# Patient Record
Sex: FEMALE | Race: WHITE | HISPANIC OR LATINO | Employment: FULL TIME | URBAN - METROPOLITAN AREA
[De-identification: names, ages, dates, MRNs, and addresses within clinical notes are randomized per-mention and may not be internally consistent; named-entity substitution may affect disease eponyms.]

---

## 2021-09-20 ENCOUNTER — PRE-ADMISSION TESTING (OUTPATIENT)
Dept: ADMISSIONS | Facility: MEDICAL CENTER | Age: 26
End: 2021-09-20
Attending: OBSTETRICS & GYNECOLOGY
Payer: COMMERCIAL

## 2021-09-21 NOTE — OR NURSING
Notified Dwaine, Pharmacist in [facility], of patients history and possible need for medications for Von Willebrand.

## 2021-09-21 NOTE — OR NURSING
Patient telephone pre admission appointment completed 09-. Patient has Von Willebrand's and is followed by [facility] in [place]. Spoke with Dr. Sunni Ríos's surgery scheduler, and she confirmed that Dr. Moeller is contacting a hematologist.

## 2021-09-23 ENCOUNTER — ANESTHESIA EVENT (OUTPATIENT)
Dept: SURGERY | Facility: MEDICAL CENTER | Age: 26
End: 2021-09-23
Payer: COMMERCIAL

## 2021-09-23 NOTE — NON-PROVIDER
COVID-19 Pre-surgery screening:  ?  1. Do you have an undiagnosed respiratory illness or symptoms such as coughing or sneezing, SOB, loss of taste or smell? (No)      2. Do you have an unexplained fever greater than 100.4 degrees Fahrenheit or 38 degrees Celsius? (No)     3. Have you had direct exposure to a patient who tested positive for Covid-19? (No)  ?  4. Have you traveled within the last 14 days? (Yes-[place])  ?  Informed of visitor policy and mask use requirement  Yes

## 2021-09-24 ENCOUNTER — HOSPITAL ENCOUNTER (OUTPATIENT)
Facility: MEDICAL CENTER | Age: 26
End: 2021-09-25
Attending: OBSTETRICS & GYNECOLOGY | Admitting: OBSTETRICS & GYNECOLOGY
Payer: COMMERCIAL

## 2021-09-24 ENCOUNTER — ANESTHESIA (OUTPATIENT)
Dept: SURGERY | Facility: MEDICAL CENTER | Age: 26
End: 2021-09-24
Payer: COMMERCIAL

## 2021-09-24 PROBLEM — D68.00 VON WILLEBRAND DISEASE (HCC): Status: ACTIVE | Noted: 2021-09-24

## 2021-09-24 LAB
ABO + RH BLD: NORMAL
ABO GROUP BLD: NORMAL
BLD GP AB SCN SERPL QL: NORMAL
ERYTHROCYTE [DISTWIDTH] IN BLOOD BY AUTOMATED COUNT: 41.5 FL (ref 35.9–50)
EXTERNAL QUALITY CONTROL: NORMAL
HCG UR QL: NEGATIVE
HCT VFR BLD AUTO: 41.5 % (ref 37–47)
HGB BLD-MCNC: 13.5 G/DL (ref 12–16)
MCH RBC QN AUTO: 29.4 PG (ref 27–33)
MCHC RBC AUTO-ENTMCNC: 32.5 G/DL (ref 33.6–35)
MCV RBC AUTO: 90.4 FL (ref 81.4–97.8)
PLATELET # BLD AUTO: 224 K/UL (ref 164–446)
PMV BLD AUTO: 11.7 FL (ref 9–12.9)
RBC # BLD AUTO: 4.59 M/UL (ref 4.2–5.4)
RH BLD: NORMAL
SARS-COV+SARS-COV-2 AG RESP QL IA.RAPID: NEGATIVE
WBC # BLD AUTO: 9.4 K/UL (ref 4.8–10.8)

## 2021-09-24 PROCEDURE — A9270 NON-COVERED ITEM OR SERVICE: HCPCS | Performed by: ANESTHESIOLOGY

## 2021-09-24 PROCEDURE — 160041 HCHG SURGERY MINUTES - EA ADDL 1 MIN LEVEL 4: Performed by: OBSTETRICS & GYNECOLOGY

## 2021-09-24 PROCEDURE — 87426 SARSCOV CORONAVIRUS AG IA: CPT | Performed by: OBSTETRICS & GYNECOLOGY

## 2021-09-24 PROCEDURE — 86900 BLOOD TYPING SEROLOGIC ABO: CPT

## 2021-09-24 PROCEDURE — 700111 HCHG RX REV CODE 636 W/ 250 OVERRIDE (IP): Performed by: OBSTETRICS & GYNECOLOGY

## 2021-09-24 PROCEDURE — 86850 RBC ANTIBODY SCREEN: CPT

## 2021-09-24 PROCEDURE — 700101 HCHG RX REV CODE 250: Performed by: OBSTETRICS & GYNECOLOGY

## 2021-09-24 PROCEDURE — 700111 HCHG RX REV CODE 636 W/ 250 OVERRIDE (IP): Performed by: ANESTHESIOLOGY

## 2021-09-24 PROCEDURE — 36415 COLL VENOUS BLD VENIPUNCTURE: CPT

## 2021-09-24 PROCEDURE — 700105 HCHG RX REV CODE 258: Performed by: OBSTETRICS & GYNECOLOGY

## 2021-09-24 PROCEDURE — 160009 HCHG ANES TIME/MIN: Performed by: OBSTETRICS & GYNECOLOGY

## 2021-09-24 PROCEDURE — 502587 HCHG PACK, D&C: Performed by: OBSTETRICS & GYNECOLOGY

## 2021-09-24 PROCEDURE — G0378 HOSPITAL OBSERVATION PER HR: HCPCS

## 2021-09-24 PROCEDURE — 160002 HCHG RECOVERY MINUTES (STAT): Performed by: OBSTETRICS & GYNECOLOGY

## 2021-09-24 PROCEDURE — 700102 HCHG RX REV CODE 250 W/ 637 OVERRIDE(OP): Performed by: OBSTETRICS & GYNECOLOGY

## 2021-09-24 PROCEDURE — 88305 TISSUE EXAM BY PATHOLOGIST: CPT

## 2021-09-24 PROCEDURE — 85027 COMPLETE CBC AUTOMATED: CPT

## 2021-09-24 PROCEDURE — 160029 HCHG SURGERY MINUTES - 1ST 30 MINS LEVEL 4: Performed by: OBSTETRICS & GYNECOLOGY

## 2021-09-24 PROCEDURE — 81025 URINE PREGNANCY TEST: CPT

## 2021-09-24 PROCEDURE — 96374 THER/PROPH/DIAG INJ IV PUSH: CPT

## 2021-09-24 PROCEDURE — 160048 HCHG OR STATISTICAL LEVEL 1-5: Performed by: OBSTETRICS & GYNECOLOGY

## 2021-09-24 PROCEDURE — A9270 NON-COVERED ITEM OR SERVICE: HCPCS | Performed by: OBSTETRICS & GYNECOLOGY

## 2021-09-24 PROCEDURE — 160036 HCHG PACU - EA ADDL 30 MINS PHASE I: Performed by: OBSTETRICS & GYNECOLOGY

## 2021-09-24 PROCEDURE — 86901 BLOOD TYPING SEROLOGIC RH(D): CPT

## 2021-09-24 PROCEDURE — 700101 HCHG RX REV CODE 250: Performed by: ANESTHESIOLOGY

## 2021-09-24 PROCEDURE — 160035 HCHG PACU - 1ST 60 MINS PHASE I: Performed by: OBSTETRICS & GYNECOLOGY

## 2021-09-24 PROCEDURE — 700102 HCHG RX REV CODE 250 W/ 637 OVERRIDE(OP): Performed by: ANESTHESIOLOGY

## 2021-09-24 RX ORDER — DOCUSATE SODIUM 100 MG/1
100 CAPSULE, LIQUID FILLED ORAL 2 TIMES DAILY
Status: DISCONTINUED | OUTPATIENT
Start: 2021-09-24 | End: 2021-09-25 | Stop reason: HOSPADM

## 2021-09-24 RX ORDER — TRANEXAMIC ACID 650 MG/1
1300 TABLET ORAL EVERY 8 HOURS
Status: DISCONTINUED | OUTPATIENT
Start: 2021-09-24 | End: 2021-09-24

## 2021-09-24 RX ORDER — TRANEXAMIC ACID 650 MG/1
TABLET ORAL
Status: ON HOLD | COMMUNITY
End: 2022-11-24

## 2021-09-24 RX ORDER — DEXAMETHASONE SODIUM PHOSPHATE 4 MG/ML
4 INJECTION, SOLUTION INTRA-ARTICULAR; INTRALESIONAL; INTRAMUSCULAR; INTRAVENOUS; SOFT TISSUE
Status: DISCONTINUED | OUTPATIENT
Start: 2021-09-24 | End: 2021-09-25 | Stop reason: HOSPADM

## 2021-09-24 RX ORDER — HYDROMORPHONE HYDROCHLORIDE 1 MG/ML
0.5 INJECTION, SOLUTION INTRAMUSCULAR; INTRAVENOUS; SUBCUTANEOUS
Status: DISCONTINUED | OUTPATIENT
Start: 2021-09-24 | End: 2021-09-25 | Stop reason: HOSPADM

## 2021-09-24 RX ORDER — BISACODYL 10 MG
10 SUPPOSITORY, RECTAL RECTAL
Status: DISCONTINUED | OUTPATIENT
Start: 2021-09-24 | End: 2021-09-25 | Stop reason: HOSPADM

## 2021-09-24 RX ORDER — HYDROMORPHONE HYDROCHLORIDE 1 MG/ML
0.2 INJECTION, SOLUTION INTRAMUSCULAR; INTRAVENOUS; SUBCUTANEOUS
Status: DISCONTINUED | OUTPATIENT
Start: 2021-09-24 | End: 2021-09-24 | Stop reason: HOSPADM

## 2021-09-24 RX ORDER — OXYCODONE HCL 5 MG/5 ML
10 SOLUTION, ORAL ORAL
Status: DISCONTINUED | OUTPATIENT
Start: 2021-09-24 | End: 2021-09-24 | Stop reason: HOSPADM

## 2021-09-24 RX ORDER — CEFAZOLIN SODIUM 1 G/3ML
INJECTION, POWDER, FOR SOLUTION INTRAMUSCULAR; INTRAVENOUS PRN
Status: DISCONTINUED | OUTPATIENT
Start: 2021-09-24 | End: 2021-09-24 | Stop reason: SURG

## 2021-09-24 RX ORDER — ACETAMINOPHEN 500 MG
1000 TABLET ORAL ONCE
Status: COMPLETED | OUTPATIENT
Start: 2021-09-24 | End: 2021-09-24

## 2021-09-24 RX ORDER — SODIUM CHLORIDE, SODIUM LACTATE, POTASSIUM CHLORIDE, CALCIUM CHLORIDE 600; 310; 30; 20 MG/100ML; MG/100ML; MG/100ML; MG/100ML
INJECTION, SOLUTION INTRAVENOUS CONTINUOUS
Status: ACTIVE | OUTPATIENT
Start: 2021-09-24 | End: 2021-09-24

## 2021-09-24 RX ORDER — SCOLOPAMINE TRANSDERMAL SYSTEM 1 MG/1
1 PATCH, EXTENDED RELEASE TRANSDERMAL
Status: DISCONTINUED | OUTPATIENT
Start: 2021-09-24 | End: 2021-09-25 | Stop reason: HOSPADM

## 2021-09-24 RX ORDER — ENEMA 19; 7 G/133ML; G/133ML
1 ENEMA RECTAL
Status: DISCONTINUED | OUTPATIENT
Start: 2021-09-24 | End: 2021-09-25 | Stop reason: HOSPADM

## 2021-09-24 RX ORDER — OXYCODONE HCL 5 MG/5 ML
5 SOLUTION, ORAL ORAL
Status: DISCONTINUED | OUTPATIENT
Start: 2021-09-24 | End: 2021-09-24 | Stop reason: HOSPADM

## 2021-09-24 RX ORDER — HALOPERIDOL 5 MG/ML
1 INJECTION INTRAMUSCULAR
Status: DISCONTINUED | OUTPATIENT
Start: 2021-09-24 | End: 2021-09-24 | Stop reason: HOSPADM

## 2021-09-24 RX ORDER — AMOXICILLIN 250 MG
1 CAPSULE ORAL
Status: DISCONTINUED | OUTPATIENT
Start: 2021-09-24 | End: 2021-09-25 | Stop reason: HOSPADM

## 2021-09-24 RX ORDER — DIPHENHYDRAMINE HYDROCHLORIDE 50 MG/ML
25 INJECTION INTRAMUSCULAR; INTRAVENOUS EVERY 6 HOURS PRN
Status: DISCONTINUED | OUTPATIENT
Start: 2021-09-24 | End: 2021-09-25 | Stop reason: HOSPADM

## 2021-09-24 RX ORDER — MIDAZOLAM HYDROCHLORIDE 1 MG/ML
INJECTION INTRAMUSCULAR; INTRAVENOUS PRN
Status: DISCONTINUED | OUTPATIENT
Start: 2021-09-24 | End: 2021-09-24 | Stop reason: SURG

## 2021-09-24 RX ORDER — ONDANSETRON 2 MG/ML
4 INJECTION INTRAMUSCULAR; INTRAVENOUS
Status: DISCONTINUED | OUTPATIENT
Start: 2021-09-24 | End: 2021-09-24 | Stop reason: HOSPADM

## 2021-09-24 RX ORDER — OXYCODONE HYDROCHLORIDE 10 MG/1
10 TABLET ORAL
Status: DISCONTINUED | OUTPATIENT
Start: 2021-09-24 | End: 2021-09-25 | Stop reason: HOSPADM

## 2021-09-24 RX ORDER — OXYCODONE HYDROCHLORIDE 5 MG/1
5 TABLET ORAL
Status: DISCONTINUED | OUTPATIENT
Start: 2021-09-24 | End: 2021-09-25 | Stop reason: HOSPADM

## 2021-09-24 RX ORDER — ONDANSETRON 2 MG/ML
4 INJECTION INTRAMUSCULAR; INTRAVENOUS EVERY 4 HOURS PRN
Status: DISCONTINUED | OUTPATIENT
Start: 2021-09-24 | End: 2021-09-25 | Stop reason: HOSPADM

## 2021-09-24 RX ORDER — SIMETHICONE 80 MG
80 TABLET,CHEWABLE ORAL EVERY 8 HOURS PRN
Status: DISCONTINUED | OUTPATIENT
Start: 2021-09-24 | End: 2021-09-25 | Stop reason: HOSPADM

## 2021-09-24 RX ORDER — ACETAMINOPHEN 500 MG
1000 TABLET ORAL EVERY 6 HOURS
Status: DISCONTINUED | OUTPATIENT
Start: 2021-09-24 | End: 2021-09-25 | Stop reason: HOSPADM

## 2021-09-24 RX ORDER — POLYETHYLENE GLYCOL 3350 17 G/17G
1 POWDER, FOR SOLUTION ORAL 2 TIMES DAILY PRN
Status: DISCONTINUED | OUTPATIENT
Start: 2021-09-24 | End: 2021-09-25 | Stop reason: HOSPADM

## 2021-09-24 RX ORDER — LIDOCAINE HYDROCHLORIDE 20 MG/ML
INJECTION, SOLUTION EPIDURAL; INFILTRATION; INTRACAUDAL; PERINEURAL PRN
Status: DISCONTINUED | OUTPATIENT
Start: 2021-09-24 | End: 2021-09-24 | Stop reason: SURG

## 2021-09-24 RX ORDER — HALOPERIDOL 5 MG/ML
1 INJECTION INTRAMUSCULAR EVERY 6 HOURS PRN
Status: DISCONTINUED | OUTPATIENT
Start: 2021-09-24 | End: 2021-09-25 | Stop reason: HOSPADM

## 2021-09-24 RX ORDER — MEPERIDINE HYDROCHLORIDE 25 MG/ML
6.25 INJECTION INTRAMUSCULAR; INTRAVENOUS; SUBCUTANEOUS
Status: DISCONTINUED | OUTPATIENT
Start: 2021-09-24 | End: 2021-09-24 | Stop reason: HOSPADM

## 2021-09-24 RX ORDER — SUMATRIPTAN 25 MG/1
TABLET, FILM COATED ORAL
Status: ON HOLD | COMMUNITY
End: 2022-11-24

## 2021-09-24 RX ORDER — ONDANSETRON 2 MG/ML
INJECTION INTRAMUSCULAR; INTRAVENOUS PRN
Status: DISCONTINUED | OUTPATIENT
Start: 2021-09-24 | End: 2021-09-24 | Stop reason: SURG

## 2021-09-24 RX ORDER — HYDROMORPHONE HYDROCHLORIDE 1 MG/ML
0.4 INJECTION, SOLUTION INTRAMUSCULAR; INTRAVENOUS; SUBCUTANEOUS
Status: DISCONTINUED | OUTPATIENT
Start: 2021-09-24 | End: 2021-09-24 | Stop reason: HOSPADM

## 2021-09-24 RX ORDER — DIPHENHYDRAMINE HYDROCHLORIDE 50 MG/ML
12.5 INJECTION INTRAMUSCULAR; INTRAVENOUS
Status: DISCONTINUED | OUTPATIENT
Start: 2021-09-24 | End: 2021-09-24 | Stop reason: HOSPADM

## 2021-09-24 RX ORDER — KETOROLAC TROMETHAMINE 30 MG/ML
30 INJECTION, SOLUTION INTRAMUSCULAR; INTRAVENOUS EVERY 6 HOURS
Status: DISCONTINUED | OUTPATIENT
Start: 2021-09-24 | End: 2021-09-25 | Stop reason: HOSPADM

## 2021-09-24 RX ORDER — SODIUM CHLORIDE, SODIUM LACTATE, POTASSIUM CHLORIDE, CALCIUM CHLORIDE 600; 310; 30; 20 MG/100ML; MG/100ML; MG/100ML; MG/100ML
INJECTION, SOLUTION INTRAVENOUS CONTINUOUS
Status: DISCONTINUED | OUTPATIENT
Start: 2021-09-24 | End: 2021-09-24 | Stop reason: HOSPADM

## 2021-09-24 RX ORDER — DEXAMETHASONE SODIUM PHOSPHATE 4 MG/ML
INJECTION, SOLUTION INTRA-ARTICULAR; INTRALESIONAL; INTRAMUSCULAR; INTRAVENOUS; SOFT TISSUE PRN
Status: DISCONTINUED | OUTPATIENT
Start: 2021-09-24 | End: 2021-09-24 | Stop reason: SURG

## 2021-09-24 RX ORDER — ACETAMINOPHEN 500 MG
1000 TABLET ORAL EVERY 6 HOURS PRN
Status: DISCONTINUED | OUTPATIENT
Start: 2021-09-29 | End: 2021-09-25 | Stop reason: HOSPADM

## 2021-09-24 RX ORDER — HYDROMORPHONE HYDROCHLORIDE 1 MG/ML
0.1 INJECTION, SOLUTION INTRAMUSCULAR; INTRAVENOUS; SUBCUTANEOUS
Status: DISCONTINUED | OUTPATIENT
Start: 2021-09-24 | End: 2021-09-24 | Stop reason: HOSPADM

## 2021-09-24 RX ORDER — SCOLOPAMINE TRANSDERMAL SYSTEM 1 MG/1
PATCH, EXTENDED RELEASE TRANSDERMAL
Status: COMPLETED
Start: 2021-09-24 | End: 2021-09-24

## 2021-09-24 RX ORDER — IBUPROFEN 800 MG/1
800 TABLET ORAL 3 TIMES DAILY PRN
Status: DISCONTINUED | OUTPATIENT
Start: 2021-09-27 | End: 2021-09-25 | Stop reason: HOSPADM

## 2021-09-24 RX ORDER — AMOXICILLIN 250 MG
1 CAPSULE ORAL NIGHTLY
Status: DISCONTINUED | OUTPATIENT
Start: 2021-09-24 | End: 2021-09-25 | Stop reason: HOSPADM

## 2021-09-24 RX ADMIN — SODIUM CHLORIDE, POTASSIUM CHLORIDE, SODIUM LACTATE AND CALCIUM CHLORIDE: 600; 310; 30; 20 INJECTION, SOLUTION INTRAVENOUS at 12:19

## 2021-09-24 RX ADMIN — KETOROLAC TROMETHAMINE 30 MG: 30 INJECTION, SOLUTION INTRAMUSCULAR at 18:04

## 2021-09-24 RX ADMIN — FENTANYL CITRATE 25 MCG: 50 INJECTION, SOLUTION INTRAMUSCULAR; INTRAVENOUS at 15:40

## 2021-09-24 RX ADMIN — PROPOFOL 25 MG: 10 INJECTION, EMULSION INTRAVENOUS at 15:21

## 2021-09-24 RX ADMIN — CEFAZOLIN 2 G: 330 INJECTION, POWDER, FOR SOLUTION INTRAMUSCULAR; INTRAVENOUS at 15:32

## 2021-09-24 RX ADMIN — MIDAZOLAM HYDROCHLORIDE 2 MG: 1 INJECTION, SOLUTION INTRAMUSCULAR; INTRAVENOUS at 15:11

## 2021-09-24 RX ADMIN — LIDOCAINE HYDROCHLORIDE 50 MG: 20 INJECTION, SOLUTION EPIDURAL; INFILTRATION; INTRACAUDAL at 15:15

## 2021-09-24 RX ADMIN — FENTANYL CITRATE 25 MCG: 50 INJECTION, SOLUTION INTRAMUSCULAR; INTRAVENOUS at 15:37

## 2021-09-24 RX ADMIN — ACETAMINOPHEN 1000 MG: 500 TABLET ORAL at 12:19

## 2021-09-24 RX ADMIN — TRANEXAMIC ACID 1000 MG: 100 INJECTION, SOLUTION INTRAVENOUS at 20:45

## 2021-09-24 RX ADMIN — VON WILLEBRAND FACTOR/COAGULATION FACTOR VIII COMPLEX (HUMAN) 3405 INT'L UNITS: 100; 100 POWDER, FOR SOLUTION INTRAVENOUS at 12:18

## 2021-09-24 RX ADMIN — PROPOFOL 150 MG: 10 INJECTION, EMULSION INTRAVENOUS at 15:15

## 2021-09-24 RX ADMIN — PROPOFOL 25 MG: 10 INJECTION, EMULSION INTRAVENOUS at 15:42

## 2021-09-24 RX ADMIN — DEXAMETHASONE SODIUM PHOSPHATE 4 MG: 4 INJECTION, SOLUTION INTRA-ARTICULAR; INTRALESIONAL; INTRAMUSCULAR; INTRAVENOUS; SOFT TISSUE at 15:32

## 2021-09-24 RX ADMIN — ONDANSETRON 4 MG: 2 INJECTION INTRAMUSCULAR; INTRAVENOUS at 15:32

## 2021-09-24 RX ADMIN — ACETAMINOPHEN 1000 MG: 500 TABLET ORAL at 18:04

## 2021-09-24 ASSESSMENT — COGNITIVE AND FUNCTIONAL STATUS - GENERAL
SUGGESTED CMS G CODE MODIFIER MOBILITY: CH
SUGGESTED CMS G CODE MODIFIER DAILY ACTIVITY: CH
MOBILITY SCORE: 24
DAILY ACTIVITIY SCORE: 24

## 2021-09-24 ASSESSMENT — PAIN DESCRIPTION - PAIN TYPE
TYPE: SURGICAL PAIN

## 2021-09-24 ASSESSMENT — PATIENT HEALTH QUESTIONNAIRE - PHQ9
1. LITTLE INTEREST OR PLEASURE IN DOING THINGS: NOT AT ALL
2. FEELING DOWN, DEPRESSED, IRRITABLE, OR HOPELESS: NOT AT ALL
SUM OF ALL RESPONSES TO PHQ9 QUESTIONS 1 AND 2: 0

## 2021-09-24 ASSESSMENT — LIFESTYLE VARIABLES
AVERAGE NUMBER OF DAYS PER WEEK YOU HAVE A DRINK CONTAINING ALCOHOL: 1
TOTAL SCORE: 0
DOES PATIENT WANT TO STOP DRINKING: NO
EVER FELT BAD OR GUILTY ABOUT YOUR DRINKING: NO
HOW MANY TIMES IN THE PAST YEAR HAVE YOU HAD 5 OR MORE DRINKS IN A DAY: 0
HAVE PEOPLE ANNOYED YOU BY CRITICIZING YOUR DRINKING: NO
HAVE YOU EVER FELT YOU SHOULD CUT DOWN ON YOUR DRINKING: NO
ALCOHOL_USE: YES
TOTAL SCORE: 0
ON A TYPICAL DAY WHEN YOU DRINK ALCOHOL HOW MANY DRINKS DO YOU HAVE: 1
CONSUMPTION TOTAL: NEGATIVE
TOTAL SCORE: 0
EVER HAD A DRINK FIRST THING IN THE MORNING TO STEADY YOUR NERVES TO GET RID OF A HANGOVER: NO

## 2021-09-24 NOTE — ANESTHESIA TIME REPORT
Anesthesia Start and Stop Event Times    No anesthesia events filed       Responsible Staff    No responsible staff documented.       Preop Diagnosis (Free Text):  Pre-op Diagnosis     ABNORMAL UTERINE BLEEDING, VON WILLEBRAND        Preop Diagnosis (Codes):    Post op Diagnosis  Abnormal uterine bleeding (AUB)      Premium Reason  Non-Premium    Comments:

## 2021-09-24 NOTE — OR SURGEON
Immediate Post OP Note    PreOp Diagnosis:   1. AUB  2. Von Willebrand Type 1    PostOp Diagnosis:   1. AUB  2. Von Willebrand Type 1    Procedure(s):  DILATION AND CURETTAGE - Wound Class: Clean Contaminated    Surgeon(s):  Carrie Moeller M.D.    Anesthesiologist/Type of Anesthesia:  Anesthesiologist: Jose Bradley M.D./General    Surgical Staff:  Circulator: Harmony Meadows R.N.; Angela Mike  Scrub Person: Desirae Townsend    Specimens removed if any:  ID Type Source Tests Collected by Time Destination   A : endometrial currettings Tissue Endometrium PATHOLOGY SPECIMEN Carrie Moeller M.D. 9/24/2021  3:39 PM        Estimated Blood Loss: 5 cc    Findings: Uterus sounded to 10 cm. Endometrial polyp noted on curettage     Complications: None        9/24/2021 3:58 PM Carrie Moeller M.D.

## 2021-09-24 NOTE — ANESTHESIA PROCEDURE NOTES
Airway    Date/Time: 9/24/2021 3:16 PM  Performed by: Jose Bradley M.D.  Authorized by: Jose Bradley M.D.     Location:  OR  Urgency:  Elective  Indications for Airway Management:  Anesthesia      Spontaneous Ventilation: absent    Sedation Level:  Deep  Preoxygenated: Yes    Final Airway Type:  Supraglottic airway  Final Supraglottic Airway:  Standard LMA    SGA Size:  3  Number of Attempts at Approach:  1

## 2021-09-24 NOTE — ANESTHESIA PREPROCEDURE EVALUATION
Von willebrands disease- patient received Von Willebrands factor in preop  anemia    Relevant Problems   No relevant active problems       Physical Exam    Airway   Mallampati: II  TM distance: >3 FB  Neck ROM: full       Cardiovascular - normal exam  Rhythm: regular  Rate: normal  (-) murmur     Dental - normal exam           Pulmonary - normal exam  Breath sounds clear to auscultation     Abdominal    Neurological - normal exam                 Anesthesia Plan    ASA 2       Plan - general       Airway plan will be LMA          Induction: intravenous    Postoperative Plan: Postoperative administration of opioids is intended.    Pertinent diagnostic labs and testing reviewed    Informed Consent:    Anesthetic plan and risks discussed with patient.    Use of blood products discussed with: patient whom consented to blood products.

## 2021-09-24 NOTE — ANESTHESIA PROCEDURE NOTES
Peripheral IV    Date/Time: 9/24/2021 3:20 PM  Performed by: Jose Bradley M.D.  Authorized by: Jose Bradley M.D.     Size:  18 G  Laterality:  Left  Site Prep:  Alcohol  Technique:  Direct puncture   t and c, cbc sent

## 2021-09-24 NOTE — ANESTHESIA POSTPROCEDURE EVALUATION
Patient: Katrina Trejo    Procedure Summary     Date: 09/24/21 Room / Location: [place] [id number] / SURGERY [facility]    Anesthesia Start: 1507 Anesthesia Stop: 1556    Procedure: DILATION AND CURETTAGE (N/A Endometrium) Diagnosis: (ABNORMAL UTERINE BLEEDING, VON WILLEBRAND)    Surgeons: Carrie Moeller M.D. Responsible Provider: Jose Bradley M.D.    Anesthesia Type: general ASA Status: 2          Final Anesthesia Type: general  Last vitals  BP   Blood Pressure: 128/79    Temp   36.3 °C (97.4 °F)    Pulse   94   Resp   (!) 22    SpO2   99 %      Anesthesia Post Evaluation    Patient location during evaluation: PACU  Patient participation: complete - patient participated  Level of consciousness: awake and alert    Airway patency: patent  Anesthetic complications: no  Cardiovascular status: hemodynamically stable  Respiratory status: acceptable  Hydration status: euvolemic    PONV: none          No complications documented.     Nurse Pain Score: 2 (NPRS)

## 2021-09-24 NOTE — ANESTHESIA TIME REPORT
Anesthesia Start and Stop Event Times     Date Time Event    9/24/2021 1504 Ready for Procedure     1507 Anesthesia Start     1556 Anesthesia Stop        Responsible Staff  09/24/21    Name Role Begin End    Jose Bradley M.D. Anesth 1507 1556        Preop Diagnosis (Free Text):  Pre-op Diagnosis     ABNORMAL UTERINE BLEEDING, VON WILLEBRAND        Preop Diagnosis (Codes):    Post op Diagnosis  Uterine bleeding      Premium Reason  A. 3PM - 7AM    Comments:

## 2021-09-24 NOTE — OP REPORT
OP Note    PreOp Diagnosis:   1. AUB  2. Von Willebrand Type 1    PostOp Diagnosis:   1. AUB  2. Von Willebrand Type 1    Procedure(s):  DILATION AND CURETTAGE - Wound Class: Clean Contaminated    Surgeon(s):  Carrie Moeller M.D.    Anesthesiologist/Type of Anesthesia:  Anesthesiologist: Jose Bradley M.D./General    Surgical Staff:  Circulator: Harmony Meadows R.N.; Angela Mike  Scrub Person: Desirae Townsend    Specimens removed if any:  ID Type Source Tests Collected by Time Destination   A : endometrial currettings Tissue Endometrium PATHOLOGY SPECIMEN Carrie Moeller M.D. 9/24/2021  3:39 PM        Estimated Blood Loss: 5 cc    Findings: Uterus sounded to 10 cm. Endometrial polyp noted on curettage.    Complications: None    PROCEDURE:  After informed consent was obtained, the patient was taken to the   operating room where general endotracheal anesthesia was applied without any   complications.  The patient was then prepped and draped in the usual sterile   manner in the lithotomy position with Adalberto stirrups.  A formal time-out was   called prior to the procedure and preoperative antibiotics were given,   examination under anesthesia was performed and the patient was noted to have a   mildly enlarged uterus. The uterus was sounded to 10 cm. A heavy weighted   Hollis speculum was then placed into the posterior vaginal vault and a right angle   retractor anteriorly in order to visualize the cervix.  The cervix was grasped at the   12 o'clock position with a single tooth tenaculum.  The cervical os was then   progressively dilated and a smooth curettage x3 was performed.      Sample was sent to pathology.     There was noted to be some slight bleeding at the tenaculum site, which was stopped   with silver nitrate. No active bleeding from the os.    Sponge, needle, instrument, and lap counts were correct x2.  Patient tolerated the procedure   well and went to recovery room in stable condition    9/24/2021 4:02 PM  Carrie Moeller M.D.

## 2021-09-25 VITALS
RESPIRATION RATE: 17 BRPM | BODY MASS INDEX: 25.37 KG/M2 | DIASTOLIC BLOOD PRESSURE: 82 MMHG | SYSTOLIC BLOOD PRESSURE: 125 MMHG | TEMPERATURE: 98.4 F | OXYGEN SATURATION: 99 % | HEIGHT: 64 IN | WEIGHT: 148.59 LBS | HEART RATE: 77 BPM

## 2021-09-25 LAB
BASOPHILS # BLD AUTO: 0.5 % (ref 0–1.8)
BASOPHILS # BLD: 0.05 K/UL (ref 0–0.12)
EOSINOPHIL # BLD AUTO: 0 K/UL (ref 0–0.51)
EOSINOPHIL NFR BLD: 0 % (ref 0–6.9)
ERYTHROCYTE [DISTWIDTH] IN BLOOD BY AUTOMATED COUNT: 43 FL (ref 35.9–50)
HCT VFR BLD AUTO: 44.6 % (ref 37–47)
HGB BLD-MCNC: 13.7 G/DL (ref 12–16)
IMM GRANULOCYTES # BLD AUTO: 0.02 K/UL (ref 0–0.11)
IMM GRANULOCYTES NFR BLD AUTO: 0.2 % (ref 0–0.9)
LYMPHOCYTES # BLD AUTO: 1.39 K/UL (ref 1–4.8)
LYMPHOCYTES NFR BLD: 14.9 % (ref 22–41)
MCH RBC QN AUTO: 29.3 PG (ref 27–33)
MCHC RBC AUTO-ENTMCNC: 30.7 G/DL (ref 33.6–35)
MCV RBC AUTO: 95.3 FL (ref 81.4–97.8)
MONOCYTES # BLD AUTO: 0.4 K/UL (ref 0–0.85)
MONOCYTES NFR BLD AUTO: 4.3 % (ref 0–13.4)
NEUTROPHILS # BLD AUTO: 7.45 K/UL (ref 2–7.15)
NEUTROPHILS NFR BLD: 80.1 % (ref 44–72)
NRBC # BLD AUTO: 0 K/UL
NRBC BLD-RTO: 0 /100 WBC
PATHOLOGY CONSULT NOTE: NORMAL
PLATELET # BLD AUTO: 211 K/UL (ref 164–446)
PMV BLD AUTO: 11.9 FL (ref 9–12.9)
RBC # BLD AUTO: 4.68 M/UL (ref 4.2–5.4)
WBC # BLD AUTO: 9.3 K/UL (ref 4.8–10.8)

## 2021-09-25 PROCEDURE — 36415 COLL VENOUS BLD VENIPUNCTURE: CPT

## 2021-09-25 PROCEDURE — 700101 HCHG RX REV CODE 250: Performed by: OBSTETRICS & GYNECOLOGY

## 2021-09-25 PROCEDURE — G0378 HOSPITAL OBSERVATION PER HR: HCPCS

## 2021-09-25 PROCEDURE — 700111 HCHG RX REV CODE 636 W/ 250 OVERRIDE (IP): Performed by: OBSTETRICS & GYNECOLOGY

## 2021-09-25 PROCEDURE — 700105 HCHG RX REV CODE 258: Performed by: OBSTETRICS & GYNECOLOGY

## 2021-09-25 PROCEDURE — 85025 COMPLETE CBC W/AUTO DIFF WBC: CPT

## 2021-09-25 RX ADMIN — TRANEXAMIC ACID 1000 MG: 100 INJECTION, SOLUTION INTRAVENOUS at 05:13

## 2021-09-25 RX ADMIN — VON WILLEBRAND FACTOR/COAGULATION FACTOR VIII COMPLEX (HUMAN) 3405 INT'L UNITS: 100; 100 POWDER, FOR SOLUTION INTRAVENOUS at 13:12

## 2021-09-25 RX ADMIN — TRANEXAMIC ACID 1000 MG: 100 INJECTION, SOLUTION INTRAVENOUS at 13:28

## 2021-09-25 ASSESSMENT — PAIN DESCRIPTION - PAIN TYPE: TYPE: ACUTE PAIN;SURGICAL PAIN

## 2021-09-25 NOTE — PROGRESS NOTES
Discharging Patient home per physician order.  Discharged with spouse.  Demonstrated understanding of discharge instructions, follow up appointments & home medications..  Ambulating without assistance, voiding without difficulty, pain well controlled, tolerating oral medications, oxygen saturation greater than 90% , tolerating diet. Educational handouts given and discussed.  Verbalized understanding of discharge instructions and educational handouts.  Stated several reasons why to return to ED or seek medical attention. All questions answered.  Belongings and dressing supplies with patient at time of discharge.

## 2021-09-25 NOTE — PROGRESS NOTES
Katrina admitted to room T404-2 via RN in tad from PACU at 1721.    Patient reports pain at 3 on a scale of 0-10. Educated patient regarding pharmacologic and non pharmacologic modalities for pain management. Oriented to room call light and smoking policy.  Reviewed plan of care (equipment, incentive spirometer, sequential compression devices, medications, activity, diet, fall precautions, skin care, and pain) with patient and family. Welcome packet given and reviewed with patient, all questions answered. Education provided on oral hygiene program.     AA&Ox4. Denies CP/SOB.  Tolerating diet. Denies N/V.  + void. Last BM PTA  Pt ambulates independently. Steady gait    Plan of care discussed, all questions answered. Educated on the importance of calling before getting OOB and pt verbalizes understanding. Educated regarding importance of oral care. Oral care kit at bedside. Call light is within reach, treaded slipper socks on, bed in lowest/ locked position, hourly rounding in place, all needs met at this time

## 2021-09-25 NOTE — PROGRESS NOTES
Assumed care of patient at 0645. Bedside report received. Assessment complete.  AA&Ox4. Denies CP/SOB.  Reporting 0/10 pain.   Skin per flow sheets.  Tolerating diet. Denies N/V.  + void. Last BM PTA, + flatus, +bowel sounds  Pt ambulates independently.  Patient eager to discharge.   Plan of care discussed, all questions answered. Educated on the importance of calling before getting OOB and pt verbalizes understanding. Educated regarding importance of oral care. Oral care kit at bedside. Call light is within reach, treaded slipper socks on, bed in lowest/ locked position, hourly rounding in place, all needs met at this time     covid 19 surge in effect

## 2021-09-25 NOTE — DISCHARGE SUMMARY
Discharge Summary:      Katrina Trejo    Admit Date:   9/24/2021  Discharge Date:  9/25/2021     Admitting diagnosis:  Abnormal uterine bleeding [N93.9]  Von Willebrand disease (HCC) [D68.0]  Discharge Diagnosis: Status post D&C and factor 8 infusion x 2.  History:  Past Medical History:   Diagnosis Date   • Abnormal uterine bleeding    • Heart burn    • History of panic attacks    • Psychiatric problem     anxiety   • Von Willebrand disease (HCC)     Patient states diagnosed with type 1 in the past. [facility].     Patient has no known allergies.  Patient Active Problem List    Diagnosis Date Noted   • Von Willebrand disease (HCC) 09/24/2021     Hospital Course:   26 y.o. was admitted with the above mentioned diagnosis, underwent IV infusion and observation. Patient postop course was unremarkable, with progressive advancement in diet , ambulation and toleration of oral analgesia. Patient also with very light bleeding and good response to meds. Patient without complaints today and desires discharge.      Vitals:    09/24/21 1935 09/24/21 2315 09/25/21 0400 09/25/21 0737   BP: 135/79 105/58 111/64 125/82   Pulse: 89 70 69 77   Resp: 18 18 18 17   Temp: 36.4 °C (97.6 °F) 36 °C (96.8 °F) 36.2 °C (97.1 °F) 36.9 °C (98.4 °F)   TempSrc: Temporal Temporal Temporal Temporal   SpO2: 95% 96% 98% 99%   Weight:       Height:         Exam:  Alert female in NAD  Abdomen: Abdomen soft, non-tender. BS normal. No masses,  No organomegaly  Fundus Non Tender: yes  Incision: none  Perineum: perineum intact  Extremity: extremities, peripheral pulses and reflexes normal     Labs:  Recent Labs     09/24/21  1755 09/25/21  0316   WBC 9.4 9.3   RBC 4.59 4.68   HEMOGLOBIN 13.5 13.7   HEMATOCRIT 41.5 44.6   MCV 90.4 95.3   MCH 29.4 29.3   MCHC 32.5* 30.7*   RDW 41.5 43.0   PLATELETCT 224 211   MPV 11.7 11.9        Activity:   Discharge to home  Pelvic Rest x 2  weeks    Assessment:  AUB with von Willebrand disease   Follow up: .4 weeks   Discharge Meds:   No current outpatient medications on file.     Carrie Moeller M.D.

## 2021-09-25 NOTE — DISCHARGE INSTRUCTIONS
Discharge Instructions    Discharged to home by car with relative. Discharged via wheelchair, hospital escort: Yes.  Special equipment needed: Not Applicable    Be sure to schedule a follow-up appointment with your primary care doctor or any specialists as instructed.     Discharge Plan:   Influenza Vaccine Indication: Patient Refuses    I understand that a diet low in cholesterol, fat, and sodium is recommended for good health. Unless I have been given specific instructions below for another diet, I accept this instruction as my diet prescription.   Other diet: regular    Special Instructions: None    · Is patient discharged on Warfarin / Coumadin?   No     Depression / Suicide Risk    As you are discharged from this [facility] facility, it is important to learn how to keep safe from harming yourself.    Recognize the warning signs:  · Abrupt changes in personality, positive or negative- including increase in energy   · Giving away possessions  · Change in eating patterns- significant weight changes-  positive or negative  · Change in sleeping patterns- unable to sleep or sleeping all the time   · Unwillingness or inability to communicate  · Depression  · Unusual sadness, discouragement and loneliness  · Talk of wanting to die  · Neglect of personal appearance   · Rebelliousness- reckless behavior  · Withdrawal from people/activities they love  · Confusion- inability to concentrate     If you or a loved one observes any of these behaviors or has concerns about self-harm, here's what you can do:  · Talk about it- your feelings and reasons for harming yourself  · Remove any means that you might use to hurt yourself (examples: pills, rope, extension cords, firearm)  · Get professional help from the community (Mental Health, Substance Abuse, psychological counseling)  · Do not be alone:Call your Safe Contact- someone whom you trust who will be there for you.  · Call your local CRISIS HOTLINE 031-7369 or  551-826-0145  · Call your local Children's Mobile Crisis Response Team [place] (119) 031-5825 or www.[employer].Stickybits  · Call the toll free National Suicide Prevention Hotlines   · National Suicide Prevention Lifeline 224-126-SXEE (2094)  · National [place] Line Network 800-SUICIDE (250-0099)

## 2021-09-25 NOTE — PROGRESS NOTES
Bedside report received.  Assessment complete.  A&O x4. Patient calls appropriately.  Patient ambulates with no assist. Bed alarm off.   Patient has 2/10 pain. Declined intervention at this time.  Skin per flow sheet.  Tolerating regular diet. Denies N/V.  + void. Last BM PTA.  Review plan with of care with patient. Call light and personal belongings within reach. Hourly rounding in place. All needs met at this time.

## 2021-09-25 NOTE — PROGRESS NOTES
4 Eyes Skin Assessment Completed by PUMA Null and PUMA Balbuena.    Head WDL  Ears WDL  Nose WDL  Mouth WDL  Neck WDL  Breast/Chest WDL  Shoulder Blades WDL  Spine WDL  (R) Arm/Elbow/Hand WDL  (L) Arm/Elbow/Hand WDL  Abdomen WDL  Groin WDL  Scrotum/Coccyx/Buttocks WDL  (R) Leg WDL  (L) Leg WDL  (R) Heel/Foot/Toe WDL  (L) Heel/Foot/Toe WDL          Devices In Places Pulse Ox and SCD's      Interventions In Place Pillows    Possible Skin Injury No    Pictures Uploaded Into Epic N/A  Wound Consult Placed N/A  RN Wound Prevention Protocol Ordered No

## 2022-11-24 ENCOUNTER — ANESTHESIA (OUTPATIENT)
Dept: ANESTHESIOLOGY | Facility: MEDICAL CENTER | Age: 27
End: 2022-11-24
Payer: COMMERCIAL

## 2022-11-24 ENCOUNTER — APPOINTMENT (OUTPATIENT)
Dept: OBGYN | Facility: MEDICAL CENTER | Age: 27
End: 2022-11-24
Attending: OBSTETRICS & GYNECOLOGY
Payer: COMMERCIAL

## 2022-11-24 ENCOUNTER — ANESTHESIA EVENT (OUTPATIENT)
Dept: ANESTHESIOLOGY | Facility: MEDICAL CENTER | Age: 27
End: 2022-11-24
Payer: COMMERCIAL

## 2022-11-24 ENCOUNTER — HOSPITAL ENCOUNTER (INPATIENT)
Facility: MEDICAL CENTER | Age: 27
LOS: 3 days | End: 2022-11-27
Attending: OBSTETRICS & GYNECOLOGY | Admitting: OBSTETRICS & GYNECOLOGY
Payer: COMMERCIAL

## 2022-11-24 LAB
BASOPHILS # BLD AUTO: 0.5 % (ref 0–1.8)
BASOPHILS # BLD: 0.04 K/UL (ref 0–0.12)
EOSINOPHIL # BLD AUTO: 0.08 K/UL (ref 0–0.51)
EOSINOPHIL NFR BLD: 1 % (ref 0–6.9)
ERYTHROCYTE [DISTWIDTH] IN BLOOD BY AUTOMATED COUNT: 41.9 FL (ref 35.9–50)
HCT VFR BLD AUTO: 35.1 % (ref 37–47)
HGB BLD-MCNC: 11.6 G/DL (ref 12–16)
HOLDING TUBE BB 8507: NORMAL
IMM GRANULOCYTES # BLD AUTO: 0.06 K/UL (ref 0–0.11)
IMM GRANULOCYTES NFR BLD AUTO: 0.7 % (ref 0–0.9)
LYMPHOCYTES # BLD AUTO: 1.91 K/UL (ref 1–4.8)
LYMPHOCYTES NFR BLD: 23.3 % (ref 22–41)
MCH RBC QN AUTO: 27.6 PG (ref 27–33)
MCHC RBC AUTO-ENTMCNC: 33 G/DL (ref 33.6–35)
MCV RBC AUTO: 83.4 FL (ref 81.4–97.8)
MONOCYTES # BLD AUTO: 0.66 K/UL (ref 0–0.85)
MONOCYTES NFR BLD AUTO: 8 % (ref 0–13.4)
NEUTROPHILS # BLD AUTO: 5.46 K/UL (ref 2–7.15)
NEUTROPHILS NFR BLD: 66.5 % (ref 44–72)
NRBC # BLD AUTO: 0 K/UL
NRBC BLD-RTO: 0 /100 WBC
PLATELET # BLD AUTO: 144 K/UL (ref 164–446)
PMV BLD AUTO: 12.4 FL (ref 9–12.9)
RBC # BLD AUTO: 4.21 M/UL (ref 4.2–5.4)
T PALLIDUM AB SER QL IA: NORMAL
WBC # BLD AUTO: 8.2 K/UL (ref 4.8–10.8)

## 2022-11-24 PROCEDURE — 36415 COLL VENOUS BLD VENIPUNCTURE: CPT

## 2022-11-24 PROCEDURE — 303615 HCHG EPIDURAL/SPINAL ANESTHESIA FOR LABOR

## 2022-11-24 PROCEDURE — A9270 NON-COVERED ITEM OR SERVICE: HCPCS | Performed by: OBSTETRICS & GYNECOLOGY

## 2022-11-24 PROCEDURE — 700111 HCHG RX REV CODE 636 W/ 250 OVERRIDE (IP): Performed by: STUDENT IN AN ORGANIZED HEALTH CARE EDUCATION/TRAINING PROGRAM

## 2022-11-24 PROCEDURE — 770002 HCHG ROOM/CARE - OB PRIVATE (112)

## 2022-11-24 PROCEDURE — 500726 HCHG BAKRI TAPENADE BALLOON

## 2022-11-24 PROCEDURE — 01967 NEURAXL LBR ANES VAG DLVR: CPT | Performed by: STUDENT IN AN ORGANIZED HEALTH CARE EDUCATION/TRAINING PROGRAM

## 2022-11-24 PROCEDURE — 700111 HCHG RX REV CODE 636 W/ 250 OVERRIDE (IP): Performed by: OBSTETRICS & GYNECOLOGY

## 2022-11-24 PROCEDURE — 700102 HCHG RX REV CODE 250 W/ 637 OVERRIDE(OP): Performed by: OBSTETRICS & GYNECOLOGY

## 2022-11-24 PROCEDURE — 3E033VJ INTRODUCTION OF OTHER HORMONE INTO PERIPHERAL VEIN, PERCUTANEOUS APPROACH: ICD-10-PCS | Performed by: OBSTETRICS & GYNECOLOGY

## 2022-11-24 PROCEDURE — 700105 HCHG RX REV CODE 258: Performed by: OBSTETRICS & GYNECOLOGY

## 2022-11-24 PROCEDURE — 85025 COMPLETE CBC W/AUTO DIFF WBC: CPT

## 2022-11-24 PROCEDURE — 3E0P7VZ INTRODUCTION OF HORMONE INTO FEMALE REPRODUCTIVE, VIA NATURAL OR ARTIFICIAL OPENING: ICD-10-PCS | Performed by: OBSTETRICS & GYNECOLOGY

## 2022-11-24 PROCEDURE — 700101 HCHG RX REV CODE 250: Performed by: STUDENT IN AN ORGANIZED HEALTH CARE EDUCATION/TRAINING PROGRAM

## 2022-11-24 PROCEDURE — 700111 HCHG RX REV CODE 636 W/ 250 OVERRIDE (IP)

## 2022-11-24 PROCEDURE — 86780 TREPONEMA PALLIDUM: CPT

## 2022-11-24 RX ORDER — SODIUM CHLORIDE, SODIUM LACTATE, POTASSIUM CHLORIDE, AND CALCIUM CHLORIDE .6; .31; .03; .02 G/100ML; G/100ML; G/100ML; G/100ML
1000 INJECTION, SOLUTION INTRAVENOUS
Status: DISCONTINUED | OUTPATIENT
Start: 2022-11-24 | End: 2022-11-25 | Stop reason: HOSPADM

## 2022-11-24 RX ORDER — ACETAMINOPHEN 500 MG
1000 TABLET ORAL
Status: COMPLETED | OUTPATIENT
Start: 2022-11-24 | End: 2022-11-25

## 2022-11-24 RX ORDER — ONDANSETRON 2 MG/ML
4 INJECTION INTRAMUSCULAR; INTRAVENOUS EVERY 6 HOURS PRN
Status: DISCONTINUED | OUTPATIENT
Start: 2022-11-24 | End: 2022-11-25 | Stop reason: HOSPADM

## 2022-11-24 RX ORDER — TERBUTALINE SULFATE 1 MG/ML
0.25 INJECTION, SOLUTION SUBCUTANEOUS
Status: DISCONTINUED | OUTPATIENT
Start: 2022-11-24 | End: 2022-11-25 | Stop reason: HOSPADM

## 2022-11-24 RX ORDER — ROPIVACAINE HYDROCHLORIDE 2 MG/ML
INJECTION, SOLUTION EPIDURAL; INFILTRATION; PERINEURAL
Status: ACTIVE
Start: 2022-11-24 | End: 2022-11-25

## 2022-11-24 RX ORDER — LIDOCAINE HYDROCHLORIDE AND EPINEPHRINE 15; 5 MG/ML; UG/ML
INJECTION, SOLUTION EPIDURAL
Status: COMPLETED | OUTPATIENT
Start: 2022-11-24 | End: 2022-11-24

## 2022-11-24 RX ORDER — ONDANSETRON 4 MG/1
4 TABLET, ORALLY DISINTEGRATING ORAL EVERY 6 HOURS PRN
Status: DISCONTINUED | OUTPATIENT
Start: 2022-11-24 | End: 2022-11-25 | Stop reason: HOSPADM

## 2022-11-24 RX ORDER — OXYTOCIN 10 [USP'U]/ML
10 INJECTION, SOLUTION INTRAMUSCULAR; INTRAVENOUS
Status: DISCONTINUED | OUTPATIENT
Start: 2022-11-24 | End: 2022-11-25 | Stop reason: HOSPADM

## 2022-11-24 RX ORDER — ROPIVACAINE HYDROCHLORIDE 2 MG/ML
INJECTION, SOLUTION EPIDURAL; INFILTRATION; PERINEURAL
Status: COMPLETED
Start: 2022-11-24 | End: 2022-11-24

## 2022-11-24 RX ORDER — SODIUM CHLORIDE, SODIUM LACTATE, POTASSIUM CHLORIDE, CALCIUM CHLORIDE 600; 310; 30; 20 MG/100ML; MG/100ML; MG/100ML; MG/100ML
1000 INJECTION, SOLUTION INTRAVENOUS CONTINUOUS
Status: ACTIVE | OUTPATIENT
Start: 2022-11-24 | End: 2022-11-24

## 2022-11-24 RX ORDER — DEXTROSE, SODIUM CHLORIDE, SODIUM LACTATE, POTASSIUM CHLORIDE, AND CALCIUM CHLORIDE 5; .6; .31; .03; .02 G/100ML; G/100ML; G/100ML; G/100ML; G/100ML
INJECTION, SOLUTION INTRAVENOUS CONTINUOUS
Status: DISCONTINUED | OUTPATIENT
Start: 2022-11-24 | End: 2022-11-25

## 2022-11-24 RX ORDER — IBUPROFEN 800 MG/1
800 TABLET ORAL
Status: DISCONTINUED | OUTPATIENT
Start: 2022-11-24 | End: 2022-11-25 | Stop reason: HOSPADM

## 2022-11-24 RX ORDER — BUPIVACAINE HYDROCHLORIDE 2.5 MG/ML
INJECTION, SOLUTION EPIDURAL; INFILTRATION; INTRACAUDAL
Status: ACTIVE
Start: 2022-11-24 | End: 2022-11-24

## 2022-11-24 RX ORDER — SODIUM CHLORIDE, SODIUM LACTATE, POTASSIUM CHLORIDE, AND CALCIUM CHLORIDE .6; .31; .03; .02 G/100ML; G/100ML; G/100ML; G/100ML
250 INJECTION, SOLUTION INTRAVENOUS PRN
Status: DISCONTINUED | OUTPATIENT
Start: 2022-11-24 | End: 2022-11-25 | Stop reason: HOSPADM

## 2022-11-24 RX ORDER — ROPIVACAINE HYDROCHLORIDE 2 MG/ML
INJECTION, SOLUTION EPIDURAL; INFILTRATION; PERINEURAL CONTINUOUS
Status: DISCONTINUED | OUTPATIENT
Start: 2022-11-24 | End: 2022-11-25

## 2022-11-24 RX ORDER — LIDOCAINE HYDROCHLORIDE 10 MG/ML
20 INJECTION, SOLUTION INFILTRATION; PERINEURAL
Status: DISCONTINUED | OUTPATIENT
Start: 2022-11-24 | End: 2022-11-25 | Stop reason: HOSPADM

## 2022-11-24 RX ADMIN — ROPIVACAINE HYDROCHLORIDE 200 MG: 2 INJECTION, SOLUTION EPIDURAL; INFILTRATION at 12:26

## 2022-11-24 RX ADMIN — OXYTOCIN 2 MILLI-UNITS/MIN: 10 INJECTION, SOLUTION INTRAMUSCULAR; INTRAVENOUS at 10:47

## 2022-11-24 RX ADMIN — ONDANSETRON 4 MG: 2 INJECTION INTRAMUSCULAR; INTRAVENOUS at 20:42

## 2022-11-24 RX ADMIN — LIDOCAINE HYDROCHLORIDE,EPINEPHRINE BITARTRATE 3 ML: 15; .005 INJECTION, SOLUTION EPIDURAL; INFILTRATION; INTRACAUDAL; PERINEURAL at 11:48

## 2022-11-24 RX ADMIN — SODIUM CHLORIDE, POTASSIUM CHLORIDE, SODIUM LACTATE AND CALCIUM CHLORIDE 1000 ML: 600; 310; 30; 20 INJECTION, SOLUTION INTRAVENOUS at 12:40

## 2022-11-24 RX ADMIN — SODIUM CHLORIDE, POTASSIUM CHLORIDE, SODIUM LACTATE AND CALCIUM CHLORIDE 1000 ML: 600; 310; 30; 20 INJECTION, SOLUTION INTRAVENOUS at 10:44

## 2022-11-24 RX ADMIN — MISOPROSTOL 25 MCG: 100 TABLET ORAL at 05:30

## 2022-11-24 RX ADMIN — ROPIVACAINE HYDROCHLORIDE: 2 INJECTION, SOLUTION EPIDURAL; INFILTRATION at 21:58

## 2022-11-24 RX ADMIN — BUPIVACAINE HYDROCHLORIDE 10 ML: 2.5 INJECTION, SOLUTION EPIDURAL; INFILTRATION; INTRACAUDAL; PERINEURAL at 11:48

## 2022-11-24 RX ADMIN — ONDANSETRON 4 MG: 2 INJECTION INTRAMUSCULAR; INTRAVENOUS at 15:01

## 2022-11-24 ASSESSMENT — LIFESTYLE VARIABLES
HAVE YOU EVER FELT YOU SHOULD CUT DOWN ON YOUR DRINKING: NO
TOTAL SCORE: 0
ALCOHOL_USE: NO
HAVE PEOPLE ANNOYED YOU BY CRITICIZING YOUR DRINKING: NO
EVER_SMOKED: YES
EVER FELT BAD OR GUILTY ABOUT YOUR DRINKING: NO
EVER HAD A DRINK FIRST THING IN THE MORNING TO STEADY YOUR NERVES TO GET RID OF A HANGOVER: NO
CONSUMPTION TOTAL: INCOMPLETE
TOTAL SCORE: 0
TOTAL SCORE: 0

## 2022-11-24 ASSESSMENT — PATIENT HEALTH QUESTIONNAIRE - PHQ9
1. LITTLE INTEREST OR PLEASURE IN DOING THINGS: NOT AT ALL
SUM OF ALL RESPONSES TO PHQ9 QUESTIONS 1 AND 2: 0
1. LITTLE INTEREST OR PLEASURE IN DOING THINGS: NOT AT ALL
SUM OF ALL RESPONSES TO PHQ9 QUESTIONS 1 AND 2: 0
2. FEELING DOWN, DEPRESSED, IRRITABLE, OR HOPELESS: NOT AT ALL
2. FEELING DOWN, DEPRESSED, IRRITABLE, OR HOPELESS: NOT AT ALL

## 2022-11-24 NOTE — PROGRESS NOTES
0700 report received from RADHA SHAH discussed with pt and   No questions at this time    0943 MD at bedside   SVE 1/80/-2    0958 Cooks catheter placed 80/50    1148 anesthesia at bedside for epidural procedure  Dr. Galloway concerned about placement of epidural catheter, suspects intrathecal placement, removed and replaced     1217 test dose    1700 report to [place] BARTHOLOMEW

## 2022-11-24 NOTE — ANESTHESIA PREPROCEDURE EVALUATION
Date: 11/24/22  Procedure: Labor Epidural       26 yo F w/ hx of vWD type 1, G1 @38 wk requesting labor epidural. Pt had vWD labs done on 9/23/22; Factor VIII activity 105%, vWF 130%, vWF activity 135%. Pt follows hematologist Dr. Gerardo at [facility]. Pt was told her levels are normal and safe to get epidural and that no need for DDAVP unless bleeding during labor. I spoke to oncall hematologist Dr. Garcia and she states pt can have epidural today and don't need to give DDAVP.    Plt 144 this morning.  Relevant Problems   No relevant active problems       Physical Exam    Airway   Mallampati: II  TM distance: >3 FB  Neck ROM: full       Cardiovascular - normal exam  Rhythm: regular  Rate: normal  (-) murmur     Dental - normal exam           Pulmonary - normal exam  Breath sounds clear to auscultation     Abdominal     Comments: gravid   Neurological - normal exam                 Anesthesia Plan    ASA 2       Plan - epidural   Neuraxial block will be labor analgesia                  Pertinent diagnostic labs and testing reviewed    Informed Consent:    Anesthetic plan and risks discussed with patient.

## 2022-11-24 NOTE — ANESTHESIA PROCEDURE NOTES
Epidural Block    Date/Time: 11/24/2022 11:48 AM  Performed by: Ricardo Galloway M.D.  Authorized by: Ricardo Galloway M.D.     Patient Location:  OB  Start Time:  11/24/2022 11:48 AM  End Time:  11/24/2022 12:17 PM  Reason for Block: labor analgesia    patient identified, IV checked, site marked, risks and benefits discussed, surgical consent, monitors and equipment checked, pre-op evaluation and timeout performed    Patient Position:  Sitting  Prep: ChloraPrep, patient draped and sterile technique    Monitoring:  Blood pressure, continuous pulse oximetry and heart rate  Approach:  Midline  Location:  L2-L3  Injection Technique:  GWENDOLYN saline  Skin infiltration:  Lidocaine  Strength:  1%  Dose:  3ml  Needle Type:  Tuohy  Needle Gauge:  17 G  Needle Length:  3.5 in  Loss of resistance::  4  Catheter Size:  19 G  Catheter at Skin Depth:  12  Test Dose Result:  Negative  Events: unintentional dural puncture    Was intrathecal catheter placed?:  No   Got wet tap on first attempt (Did not notice significant change in resistance. Removed the glass syringe and inserted the stylet. After removing stylet, saw fluid dripping).    Decided to take the touhy out and tried to go to a level below but kept hitting spinous processes. Went to one space above the initial level and successful epidural placement.    Test dose negative.    Informed pt about wet tap, s/sx of PDPH and treatment plan. Will evaluate pt in postpartum.    Discussed w/ hematologist Dr. Robertson ([phone]) regarding pt possibly needing epidural blood patch and she recommended just checking CBC and consider giving amicar or TXA before doing the blood patch.

## 2022-11-24 NOTE — CARE PLAN
Problem: Knowledge Deficit - L&D  Goal: Patient and family/caregivers will demonstrate understanding of plan of care, disease process/condition, diagnostic tests and medications  Outcome: Progressing     Problem: Psychosocial - L&D  Goal: Patient's level of anxiety will decrease  Outcome: Progressing     Problem: Pain  Goal: Patient's pain will be alleviated or reduced to the patient’s comfort goal  Outcome: Progressing   The patient is Stable - Low risk of patient condition declining or worsening

## 2022-11-24 NOTE — H&P
"History and Physical      Katrinaenmanuel Trejo is a 27 y.o. female [id number] 38w0d who presents for IOL secondary to IUGR. Pt also has von Willebrand but no bleeding and factors are well controlled. Denies any CTXs, LOF or VB. Good FM    ROS: A comprehensive review of systems was negative.    Past Medical History:   Diagnosis Date    Abnormal uterine bleeding     Heart burn     History of panic attacks     Psychiatric problem     anxiety    Von Willebrand disease     Patient states diagnosed with type 1 in the past. [facility] by [facility].     Past Surgical History:   Procedure Laterality Date    DILATION AND CURETTAGE N/A 2021    Procedure: DILATION AND CURETTAGE;  Surgeon: Carrie Moeller M.D.;  Location: SURGERY [facility];  Service: Obstetrics    DENTAL EXTRACTION(S)      wisdom teeth     History reviewed. No pertinent family history.    Social History     Socioeconomic History    Marital status:      Spouse name: Not on file    Number of children: Not on file    Years of education: Not on file    Highest education level: Not on file   Occupational History    Not on file   Tobacco Use    Smoking status: Never    Smokeless tobacco: Never   Vaping Use    Vaping Use: Some days    Substances: Nicotine   Substance and Sexual Activity    Alcohol use: Yes     Comment: \"socially\"    Drug use: Not Currently    Sexual activity: Not on file   Other Topics Concern    Not on file   Social History Narrative    Not on file     Social Determinants of Health     Financial Resource Strain: Not on file   Food Insecurity: Not on file   Transportation Needs: Not on file   Physical Activity: Not on file   Stress: Not on file   Social Connections: Not on file   Intimate Partner Violence: Not on file   Housing Stability: Not on file     Allergies: Patient has no known allergies.    Current Facility-Administered Medications:     lidocaine (XYLOCAINE) 1%  injection, 20 mL, Subcutaneous, " "Once PRN, Carrie Moeller M.D.    terbutaline (BRETHINE) injection 0.25 mg, 0.25 mg, Subcutaneous, Once PRN, Carrie Moeller M.D.    oxytocin (PITOCIN) infusion (for post delivery), 500 mL, Intravenous, Once, Dose not Required at 11/24/22 0545 **FOLLOWED BY** oxytocin (PITOCIN) infusion (for post delivery), 125 mL/hr, Intravenous, Continuous, Carrie Moeller M.D., Dose not Required at 11/24/22 0545    oxytocin (PITOCIN) injection 10 Units, 10 Units, Intramuscular, Once PRN, Carrie Moeller M.D.    ibuprofen (MOTRIN) tablet 800 mg, 800 mg, Oral, Once PRN, Carrie Moeller M.D.    acetaminophen (TYLENOL) tablet 1,000 mg, 1,000 mg, Oral, Once PRN, Carrie Moeller M.D.    lactated ringers infusion, 1,000 mL, Intravenous, Continuous, Last Rate: 125 mL/hr at 11/24/22 1044, 1,000 mL at 11/24/22 1044 **FOLLOWED BY** D5LR infusion, , Intravenous, Continuous, Carrie Moeller M.D.    ondansetron (ZOFRAN ODT) dispertab 4 mg, 4 mg, Oral, Q6HRS PRN **OR** ondansetron (ZOFRAN) syringe/vial injection 4 mg, 4 mg, Intravenous, Q6HRS PRN, Carrie Moeller M.D.    oxytocin (Pitocin) 0.02 Units/mL LR (induction of labor), 0.5-20 harmeet-units/min, Intravenous, Continuous, Carrie Moeller M.D., Last Rate: 6 mL/hr at 11/24/22 1047, 2 harmeet-units/min at 11/24/22 1047    Prenatal care with Moeller starting at 1st trimester with following problems:  Patient Active Problem List    Diagnosis Date Noted    Von Willebrand disease 09/24/2021       Objective:      /87   Pulse (!) 107   Temp 36.3 °C (97.4 °F) (Temporal)   Resp 18   Ht 1.626 m (5' 4\")   Wt 75.3 kg (166 lb)   SpO2 95%     General:   no acute distress, alert, cooperative   Skin:   normal   HEENT:  EOMI and Neck supple with midline trachea   Lungs:   CTA bilateral   Heart:   regular rate and rhythm, no pedal edema, no JVD, no hepatosplenomegaly   Abdomen:   gravid, NT   EFW:  6 lbs 6 oz   Pelvis:  adequate with gynecoid pelvis   FHT:  Reactive NST    Uterine Size: S<D   Presentations: Cephalic   Cervix:     Dilation: 1cm    " Effacement: 50%    Station:  -2    Consistency: Soft    Position: Middle     Lab Review  Lab:   Blood type: A     Recent Results (from the past 5880 hour(s))   Hold Blood Bank Specimen (Not Tested)    Collection Time: 11/24/22  4:50 AM   Result Value Ref Range    Holding Tube - Bb DONE    CBC with differential    Collection Time: 11/24/22  4:50 AM   Result Value Ref Range    WBC 8.2 4.8 - 10.8 K/uL    RBC 4.21 4.20 - 5.40 M/uL    Hemoglobin 11.6 (L) 12.0 - 16.0 g/dL    Hematocrit 35.1 (L) 37.0 - 47.0 %    MCV 83.4 81.4 - 97.8 fL    MCH 27.6 27.0 - 33.0 pg    MCHC 33.0 (L) 33.6 - 35.0 g/dL    RDW 41.9 35.9 - 50.0 fL    Platelet Count 144 (L) 164 - 446 K/uL    MPV 12.4 9.0 - 12.9 fL    Neutrophils-Polys 66.50 44.00 - 72.00 %    Lymphocytes 23.30 22.00 - 41.00 %    Monocytes 8.00 0.00 - 13.40 %    Eosinophils 1.00 0.00 - 6.90 %    Basophils 0.50 0.00 - 1.80 %    Immature Granulocytes 0.70 0.00 - 0.90 %    Nucleated RBC 0.00 /100 WBC    Neutrophils (Absolute) 5.46 2.00 - 7.15 K/uL    Lymphs (Absolute) 1.91 1.00 - 4.80 K/uL    Monos (Absolute) 0.66 0.00 - 0.85 K/uL    Eos (Absolute) 0.08 0.00 - 0.51 K/uL    Baso (Absolute) 0.04 0.00 - 0.12 K/uL    Immature Granulocytes (abs) 0.06 0.00 - 0.11 K/uL    NRBC (Absolute) 0.00 K/uL   T.PALLIDUM AB CHASITY (Syphilis)    Collection Time: 11/24/22  4:50 AM   Result Value Ref Range    Syphilis, Treponemal Qual Non-Reactive Non-Reactive        Assessment:   Katrina Trejo at 38w0d  Labor status: Not in labor.  Obstetrical history significant for   Patient Active Problem List    Diagnosis Date Noted    Von Willebrand disease 09/24/2021   .   Plan:     Admit to L&D  GBS negative  Started IOL with cytotec x 1 then balloon placed  Epidural when desired

## 2022-11-24 NOTE — PROGRESS NOTES
0415: PT is a , EDC of  with a GA of 38, All questions answered; pt resting comfortably in bed with significant other at bedside; denies needs at this time; VS in stable condition; pt educated regarding call light system; call light and pt belongings in reach, pt encouraged to call RN for any needs, wants, desires or concerns. Whiteboard updated. Sunni BASSETT updated and orders received.     0700: Gave report to Mamie BARTHOLOMEW. POC discussed.

## 2022-11-25 LAB
BASOPHILS # BLD AUTO: 0.4 % (ref 0–1.8)
BASOPHILS # BLD: 0.09 K/UL (ref 0–0.12)
EOSINOPHIL # BLD AUTO: 0 K/UL (ref 0–0.51)
EOSINOPHIL NFR BLD: 0 % (ref 0–6.9)
ERYTHROCYTE [DISTWIDTH] IN BLOOD BY AUTOMATED COUNT: 42.9 FL (ref 35.9–50)
ERYTHROCYTE [DISTWIDTH] IN BLOOD BY AUTOMATED COUNT: 43.5 FL (ref 35.9–50)
HCT VFR BLD AUTO: 24 % (ref 37–47)
HCT VFR BLD AUTO: 34.9 % (ref 37–47)
HGB BLD-MCNC: 11.4 G/DL (ref 12–16)
HGB BLD-MCNC: 7.8 G/DL (ref 12–16)
IMM GRANULOCYTES # BLD AUTO: 0.22 K/UL (ref 0–0.11)
IMM GRANULOCYTES NFR BLD AUTO: 0.9 % (ref 0–0.9)
LYMPHOCYTES # BLD AUTO: 0.98 K/UL (ref 1–4.8)
LYMPHOCYTES NFR BLD: 4 % (ref 22–41)
MCH RBC QN AUTO: 27.6 PG (ref 27–33)
MCH RBC QN AUTO: 27.8 PG (ref 27–33)
MCHC RBC AUTO-ENTMCNC: 32.5 G/DL (ref 33.6–35)
MCHC RBC AUTO-ENTMCNC: 32.7 G/DL (ref 33.6–35)
MCV RBC AUTO: 84.8 FL (ref 81.4–97.8)
MCV RBC AUTO: 85.1 FL (ref 81.4–97.8)
MONOCYTES # BLD AUTO: 1.51 K/UL (ref 0–0.85)
MONOCYTES NFR BLD AUTO: 6.1 % (ref 0–13.4)
NEUTROPHILS # BLD AUTO: 21.9 K/UL (ref 2–7.15)
NEUTROPHILS NFR BLD: 88.6 % (ref 44–72)
NRBC # BLD AUTO: 0 K/UL
NRBC BLD-RTO: 0 /100 WBC
PLATELET # BLD AUTO: 125 K/UL (ref 164–446)
PLATELET # BLD AUTO: 171 K/UL (ref 164–446)
PMV BLD AUTO: 12.5 FL (ref 9–12.9)
PMV BLD AUTO: 12.7 FL (ref 9–12.9)
RBC # BLD AUTO: 2.83 M/UL (ref 4.2–5.4)
RBC # BLD AUTO: 4.1 M/UL (ref 4.2–5.4)
WBC # BLD AUTO: 18.1 K/UL (ref 4.8–10.8)
WBC # BLD AUTO: 24.7 K/UL (ref 4.8–10.8)

## 2022-11-25 PROCEDURE — 700111 HCHG RX REV CODE 636 W/ 250 OVERRIDE (IP): Performed by: STUDENT IN AN ORGANIZED HEALTH CARE EDUCATION/TRAINING PROGRAM

## 2022-11-25 PROCEDURE — A9270 NON-COVERED ITEM OR SERVICE: HCPCS | Performed by: OBSTETRICS & GYNECOLOGY

## 2022-11-25 PROCEDURE — A9270 NON-COVERED ITEM OR SERVICE: HCPCS

## 2022-11-25 PROCEDURE — 0KQM0ZZ REPAIR PERINEUM MUSCLE, OPEN APPROACH: ICD-10-PCS | Performed by: OBSTETRICS & GYNECOLOGY

## 2022-11-25 PROCEDURE — 700101 HCHG RX REV CODE 250: Performed by: OBSTETRICS & GYNECOLOGY

## 2022-11-25 PROCEDURE — 85027 COMPLETE CBC AUTOMATED: CPT

## 2022-11-25 PROCEDURE — 59409 OBSTETRICAL CARE: CPT

## 2022-11-25 PROCEDURE — 700105 HCHG RX REV CODE 258: Performed by: STUDENT IN AN ORGANIZED HEALTH CARE EDUCATION/TRAINING PROGRAM

## 2022-11-25 PROCEDURE — 700111 HCHG RX REV CODE 636 W/ 250 OVERRIDE (IP): Performed by: OBSTETRICS & GYNECOLOGY

## 2022-11-25 PROCEDURE — 770002 HCHG ROOM/CARE - OB PRIVATE (112)

## 2022-11-25 PROCEDURE — 700105 HCHG RX REV CODE 258: Performed by: OBSTETRICS & GYNECOLOGY

## 2022-11-25 PROCEDURE — 85025 COMPLETE CBC W/AUTO DIFF WBC: CPT

## 2022-11-25 PROCEDURE — 700102 HCHG RX REV CODE 250 W/ 637 OVERRIDE(OP): Performed by: OBSTETRICS & GYNECOLOGY

## 2022-11-25 PROCEDURE — 36415 COLL VENOUS BLD VENIPUNCTURE: CPT

## 2022-11-25 PROCEDURE — 10H07YZ INSERTION OF OTHER DEVICE INTO PRODUCTS OF CONCEPTION, VIA NATURAL OR ARTIFICIAL OPENING: ICD-10-PCS | Performed by: OBSTETRICS & GYNECOLOGY

## 2022-11-25 PROCEDURE — 700102 HCHG RX REV CODE 250 W/ 637 OVERRIDE(OP)

## 2022-11-25 PROCEDURE — 304965 HCHG RECOVERY SERVICES

## 2022-11-25 RX ORDER — OXYCODONE HYDROCHLORIDE 5 MG/1
5 TABLET ORAL EVERY 4 HOURS PRN
Status: DISCONTINUED | OUTPATIENT
Start: 2022-11-25 | End: 2022-11-27 | Stop reason: HOSPADM

## 2022-11-25 RX ORDER — BISACODYL 10 MG
10 SUPPOSITORY, RECTAL RECTAL PRN
Status: DISCONTINUED | OUTPATIENT
Start: 2022-11-25 | End: 2022-11-27 | Stop reason: HOSPADM

## 2022-11-25 RX ORDER — ACETAMINOPHEN 500 MG
1000 TABLET ORAL EVERY 6 HOURS PRN
Status: DISCONTINUED | OUTPATIENT
Start: 2022-11-25 | End: 2022-11-27 | Stop reason: HOSPADM

## 2022-11-25 RX ORDER — VITAMIN A ACETATE, BETA CAROTENE, ASCORBIC ACID, CHOLECALCIFEROL, .ALPHA.-TOCOPHEROL ACETATE, DL-, THIAMINE MONONITRATE, RIBOFLAVIN, NIACINAMIDE, PYRIDOXINE HYDROCHLORIDE, FOLIC ACID, CYANOCOBALAMIN, CALCIUM CARBONATE, FERROUS FUMARATE, ZINC OXIDE, CUPRIC OXIDE 3080; 12; 120; 400; 1; 1.84; 3; 20; 22; 920; 25; 200; 27; 10; 2 [IU]/1; UG/1; MG/1; [IU]/1; MG/1; MG/1; MG/1; MG/1; MG/1; [IU]/1; MG/1; MG/1; MG/1; MG/1; MG/1
1 TABLET, FILM COATED ORAL
Status: DISCONTINUED | OUTPATIENT
Start: 2022-11-25 | End: 2022-11-27 | Stop reason: HOSPADM

## 2022-11-25 RX ORDER — DOCUSATE SODIUM 100 MG/1
100 CAPSULE, LIQUID FILLED ORAL 2 TIMES DAILY PRN
Status: DISCONTINUED | OUTPATIENT
Start: 2022-11-25 | End: 2022-11-27 | Stop reason: HOSPADM

## 2022-11-25 RX ORDER — MISOPROSTOL 200 UG/1
TABLET ORAL
Status: COMPLETED
Start: 2022-11-25 | End: 2022-11-25

## 2022-11-25 RX ORDER — SODIUM CHLORIDE, SODIUM LACTATE, POTASSIUM CHLORIDE, CALCIUM CHLORIDE 600; 310; 30; 20 MG/100ML; MG/100ML; MG/100ML; MG/100ML
INJECTION, SOLUTION INTRAVENOUS PRN
Status: DISCONTINUED | OUTPATIENT
Start: 2022-11-25 | End: 2022-11-27 | Stop reason: HOSPADM

## 2022-11-25 RX ADMIN — ROPIVACAINE HYDROCHLORIDE: 2 INJECTION, SOLUTION EPIDURAL; INFILTRATION at 07:36

## 2022-11-25 RX ADMIN — TRANEXAMIC ACID 1000 MG: 100 INJECTION, SOLUTION INTRAVENOUS at 10:59

## 2022-11-25 RX ADMIN — ACETAMINOPHEN 1000 MG: 500 TABLET ORAL at 19:27

## 2022-11-25 RX ADMIN — OXYTOCIN 125 ML/HR: 10 INJECTION, SOLUTION INTRAMUSCULAR; INTRAVENOUS at 13:25

## 2022-11-25 RX ADMIN — SODIUM CHLORIDE, SODIUM LACTATE, POTASSIUM CHLORIDE, CALCIUM CHLORIDE AND DEXTROSE MONOHYDRATE: 5; 600; 310; 30; 20 INJECTION, SOLUTION INTRAVENOUS at 04:10

## 2022-11-25 RX ADMIN — DESMOPRESSIN ACETATE 20 MCG: 4 SOLUTION INTRAVENOUS at 13:06

## 2022-11-25 RX ADMIN — MISOPROSTOL 800 MCG: 200 TABLET ORAL at 10:45

## 2022-11-25 RX ADMIN — OXYTOCIN 125 ML/HR: 10 INJECTION, SOLUTION INTRAMUSCULAR; INTRAVENOUS at 14:37

## 2022-11-25 RX ADMIN — ACETAMINOPHEN 1000 MG: 500 TABLET ORAL at 11:52

## 2022-11-25 RX ADMIN — OXYTOCIN 18 MILLI-UNITS/MIN: 10 INJECTION, SOLUTION INTRAMUSCULAR; INTRAVENOUS at 01:04

## 2022-11-25 RX ADMIN — SODIUM CHLORIDE, POTASSIUM CHLORIDE, SODIUM LACTATE AND CALCIUM CHLORIDE 250 ML: 600; 310; 30; 20 INJECTION, SOLUTION INTRAVENOUS at 10:58

## 2022-11-25 RX ADMIN — OXYTOCIN 10 UNITS: 10 INJECTION, SOLUTION INTRAMUSCULAR; INTRAVENOUS at 10:40

## 2022-11-25 RX ADMIN — ONDANSETRON 4 MG: 2 INJECTION INTRAMUSCULAR; INTRAVENOUS at 02:27

## 2022-11-25 RX ADMIN — TRANEXAMIC ACID 1000 MG: 100 INJECTION, SOLUTION INTRAVENOUS at 22:00

## 2022-11-25 ASSESSMENT — PATIENT HEALTH QUESTIONNAIRE - PHQ9
2. FEELING DOWN, DEPRESSED, IRRITABLE, OR HOPELESS: NOT AT ALL
1. LITTLE INTEREST OR PLEASURE IN DOING THINGS: NOT AT ALL
SUM OF ALL RESPONSES TO PHQ9 QUESTIONS 1 AND 2: 0

## 2022-11-25 ASSESSMENT — PAIN DESCRIPTION - PAIN TYPE
TYPE: ACUTE PAIN
TYPE: ACUTE PAIN

## 2022-11-25 NOTE — CARE PLAN
The patient is Stable - Low risk of patient condition declining or worsening    Shift Goals  Clinical Goals: remain clinically stable  Patient Goals: Have a baby  Family Goals: support    Progress made toward(s) clinical / shift goals:  Patient will ask for pain medication when needed.  Patient has light lochia with a firm palpable uterus.  Vital signs are within defined limits.  Assessment will continue.     Patient is not progressing towards the following goals:

## 2022-11-25 NOTE — PROGRESS NOTES
0700 report received from RADHA RN Crystal  POC discussed    0810 SROM, clear, small amount    0910 MD at bedside  IUPC placed  SVE /-1    0942 MD at bedside  SVE complete/+1  Orders received for trial pushing     1036  of viable male infant, apgars 89    1045  of intact placenta  Fundus soft, at umbilicus, bleeding moderate (250 mL qbl)  Orders received for: TXA, desmopressin, rectal cytotec     1415 pt up to void, fundus firm, bleeding mod/light  Pt stable     1445 Pt transferred to postpartum unit

## 2022-11-25 NOTE — ANESTHESIA POSTPROCEDURE EVALUATION
Patient: Katrina Trejo    Procedure Summary     Date: 11/24/22 Room / Location:     Anesthesia Start: 1149 Anesthesia Stop: 11/25/22 1036    Procedure: Labor Epidural Diagnosis:     Scheduled Providers:  Responsible Provider: Kyler Jimenez M.D.    Anesthesia Type: epidural ASA Status: 2          Final Anesthesia Type: epidural  Last vitals  BP   Blood Pressure: (!) 135/97    Temp   36.4 °C (97.5 °F)    Pulse   (!) 108   Resp   20    SpO2   98 %      Anesthesia Post Evaluation    Patient location during evaluation: PACU  Patient participation: complete - patient participated  Level of consciousness: awake and alert    Airway patency: patent  Anesthetic complications: no  Cardiovascular status: hemodynamically stable  Respiratory status: acceptable  Hydration status: euvolemic    PONV: none          There were no known notable events for this encounter.     Nurse Pain Score: 0 (NPRS)

## 2022-11-25 NOTE — CARE PLAN
The patient is Stable - Low risk of patient condition declining or worsening    Shift Goals  Clinical Goals: Healthy mom, healthy baby  Patient Goals: Have a baby  Family Goals: support    Progress made toward(s) clinical / shift goals:  Continues with IOL. Making cervical change. Comfortable with epidural. Anticipate vaginal delivery    Patient is not progressing towards the following goals:

## 2022-11-25 NOTE — CARE PLAN
The patient is Stable - Low risk of patient condition declining or worsening    Problem: Knowledge Deficit - L&D  Goal: Patient and family/caregivers will demonstrate understanding of plan of care, disease process/condition, diagnostic tests and medications  Outcome: Progressing     Problem: Risk for Excess Fluid Volume  Goal: Patient will demonstrate pulse, blood pressure and neurologic signs within expected ranges and without any respiratory complications  Outcome: Progressing     Shift Goals  Clinical Goals: Healthy mom, healthy baby  Patient Goals: Have a baby  Family Goals: support    Progress made toward(s) clinical / shift goals:  cervical dilation and effacement    Patient is not progressing towards the following goals:

## 2022-11-26 LAB
ERYTHROCYTE [DISTWIDTH] IN BLOOD BY AUTOMATED COUNT: 44.4 FL (ref 35.9–50)
HCT VFR BLD AUTO: 23.4 % (ref 37–47)
HGB BLD-MCNC: 7.5 G/DL (ref 12–16)
MCH RBC QN AUTO: 27.7 PG (ref 27–33)
MCHC RBC AUTO-ENTMCNC: 32.1 G/DL (ref 33.6–35)
MCV RBC AUTO: 86.3 FL (ref 81.4–97.8)
PLATELET # BLD AUTO: 108 K/UL (ref 164–446)
PMV BLD AUTO: 12.6 FL (ref 9–12.9)
RBC # BLD AUTO: 2.71 M/UL (ref 4.2–5.4)
WBC # BLD AUTO: 12.9 K/UL (ref 4.8–10.8)

## 2022-11-26 PROCEDURE — 700101 HCHG RX REV CODE 250: Performed by: OBSTETRICS & GYNECOLOGY

## 2022-11-26 PROCEDURE — 90686 IIV4 VACC NO PRSV 0.5 ML IM: CPT | Performed by: OBSTETRICS & GYNECOLOGY

## 2022-11-26 PROCEDURE — 36415 COLL VENOUS BLD VENIPUNCTURE: CPT

## 2022-11-26 PROCEDURE — 700102 HCHG RX REV CODE 250 W/ 637 OVERRIDE(OP): Performed by: OBSTETRICS & GYNECOLOGY

## 2022-11-26 PROCEDURE — 3E02340 INTRODUCTION OF INFLUENZA VACCINE INTO MUSCLE, PERCUTANEOUS APPROACH: ICD-10-PCS | Performed by: OBSTETRICS & GYNECOLOGY

## 2022-11-26 PROCEDURE — 700111 HCHG RX REV CODE 636 W/ 250 OVERRIDE (IP): Performed by: OBSTETRICS & GYNECOLOGY

## 2022-11-26 PROCEDURE — 770002 HCHG ROOM/CARE - OB PRIVATE (112)

## 2022-11-26 PROCEDURE — A9270 NON-COVERED ITEM OR SERVICE: HCPCS | Performed by: OBSTETRICS & GYNECOLOGY

## 2022-11-26 PROCEDURE — 90715 TDAP VACCINE 7 YRS/> IM: CPT | Performed by: OBSTETRICS & GYNECOLOGY

## 2022-11-26 PROCEDURE — 3E0234Z INTRODUCTION OF SERUM, TOXOID AND VACCINE INTO MUSCLE, PERCUTANEOUS APPROACH: ICD-10-PCS | Performed by: OBSTETRICS & GYNECOLOGY

## 2022-11-26 PROCEDURE — 700105 HCHG RX REV CODE 258: Performed by: OBSTETRICS & GYNECOLOGY

## 2022-11-26 PROCEDURE — 90471 IMMUNIZATION ADMIN: CPT

## 2022-11-26 PROCEDURE — 85027 COMPLETE CBC AUTOMATED: CPT

## 2022-11-26 RX ORDER — ONDANSETRON 4 MG/1
4 TABLET, ORALLY DISINTEGRATING ORAL EVERY 6 HOURS
Status: DISCONTINUED | OUTPATIENT
Start: 2022-11-26 | End: 2022-11-27 | Stop reason: HOSPADM

## 2022-11-26 RX ORDER — ONDANSETRON 4 MG/1
4 TABLET, ORALLY DISINTEGRATING ORAL EVERY 8 HOURS PRN
Status: DISCONTINUED | OUTPATIENT
Start: 2022-11-26 | End: 2022-11-26

## 2022-11-26 RX ADMIN — ACETAMINOPHEN 1000 MG: 500 TABLET ORAL at 13:50

## 2022-11-26 RX ADMIN — ACETAMINOPHEN 1000 MG: 500 TABLET ORAL at 01:29

## 2022-11-26 RX ADMIN — ONDANSETRON 4 MG: 4 TABLET, ORALLY DISINTEGRATING ORAL at 22:16

## 2022-11-26 RX ADMIN — TRANEXAMIC ACID 1000 MG: 100 INJECTION, SOLUTION INTRAVENOUS at 06:50

## 2022-11-26 RX ADMIN — TRANEXAMIC ACID 1000 MG: 100 INJECTION, SOLUTION INTRAVENOUS at 22:20

## 2022-11-26 RX ADMIN — INFLUENZA A VIRUS A/VICTORIA/2570/2019 IVR-215 (H1N1) ANTIGEN (FORMALDEHYDE INACTIVATED), INFLUENZA A VIRUS A/DARWIN/9/2021 SAN-010 (H3N2) ANTIGEN (FORMALDEHYDE INACTIVATED), INFLUENZA B VIRUS B/PHUKET/3073/2013 ANTIGEN (FORMALDEHYDE INACTIVATED), AND INFLUENZA B VIRUS B/MICHIGAN/01/2021 ANTIGEN (FORMALDEHYDE INACTIVATED) 0.5 ML: 15; 15; 15; 15 INJECTION, SUSPENSION INTRAMUSCULAR at 06:47

## 2022-11-26 RX ADMIN — TRANEXAMIC ACID 1000 MG: 100 INJECTION, SOLUTION INTRAVENOUS at 14:22

## 2022-11-26 RX ADMIN — ONDANSETRON 4 MG: 4 TABLET, ORALLY DISINTEGRATING ORAL at 08:46

## 2022-11-26 RX ADMIN — TETANUS TOXOID, REDUCED DIPHTHERIA TOXOID AND ACELLULAR PERTUSSIS VACCINE, ADSORBED 0.5 ML: 5; 2.5; 8; 8; 2.5 SUSPENSION INTRAMUSCULAR at 06:44

## 2022-11-26 RX ADMIN — ONDANSETRON 4 MG: 4 TABLET, ORALLY DISINTEGRATING ORAL at 16:16

## 2022-11-26 ASSESSMENT — PAIN DESCRIPTION - PAIN TYPE: TYPE: ACUTE PAIN

## 2022-11-26 ASSESSMENT — EDINBURGH POSTNATAL DEPRESSION SCALE (EPDS)
I HAVE BEEN SO UNHAPPY THAT I HAVE BEEN CRYING: NO, NEVER
THE THOUGHT OF HARMING MYSELF HAS OCCURRED TO ME: NEVER
I HAVE BEEN ANXIOUS OR WORRIED FOR NO GOOD REASON: HARDLY EVER
I HAVE BEEN SO UNHAPPY THAT I HAVE HAD DIFFICULTY SLEEPING: NOT AT ALL
I HAVE BEEN ABLE TO LAUGH AND SEE THE FUNNY SIDE OF THINGS: AS MUCH AS I ALWAYS COULD
I HAVE BLAMED MYSELF UNNECESSARILY WHEN THINGS WENT WRONG: NOT VERY OFTEN
I HAVE FELT SAD OR MISERABLE: NO, NOT AT ALL
THINGS HAVE BEEN GETTING ON TOP OF ME: NO, MOST OF THE TIME I HAVE COPED QUITE WELL
I HAVE LOOKED FORWARD WITH ENJOYMENT TO THINGS: AS MUCH AS I EVER DID
I HAVE FELT SCARED OR PANICKY FOR NO GOOD REASON: NO, NOT MUCH

## 2022-11-26 NOTE — L&D DELIVERY NOTE
DATE OF SERVICE:  2022     The patient is a 27-year-old -0-1-0, at 38-1/7 weeks who presented for   induction of labor secondary to IUGR.  The patient also has von Willebrand   factor, however, she has not had any bleeding problems or any other issues   with her von Willebrand during this pregnancy.  Upon arrival, the patient had   unfavorable cervix, was started on Cytotec x1, followed by the balloon.  The   patient then had spontaneous rupture of membranes with clear amniotic fluid,   and she was approximately 9 cm when the baby started having what looked   like occasional late decelerations.  At that time, the patient was asked to   push and she was able to push past the cervix and the baby actually did very   well with pushing and had a reactive tracing.  She went to complete dilation   and delivered a viable male infant, Apgars of 8 and 9 over a second-degree   perineal laceration.  There was nuchal cord x1, which was easily reduced and   the remainder of infant delivered without complication.  Placenta delivered   spontaneously.  Three-vessel cord noted to be intact.  EBL is 500 mL.   Anesthesia is epidural.  The laceration was approximated with 3-0 Vicryl.  Sex   of the infant is male.  Apgars 8 and 9, weight of 5 pounds 14.9 ounces.  The   patient received TXA IV along with Cytotec and Pitocin after delivery and the   patient, secondary to the EBL of 500 mL and some trickling, will receive DDAVP   x1 as well.  She will be continued on the TXA IV every 8 hours for the   remainder of the hospital stay and then when she goes home, she will take the   TXA at home t.i.d. for at least 5 days total.  Currently, mother and infant   are both doing well.        ______________________________  MD BRYANT Martinez/AMERICA    DD:  2022 13:11  DT:  2022 20:37    Job#:  [id number]

## 2022-11-26 NOTE — CARE PLAN
Problem: Knowledge Deficit - Postpartum  Goal: Patient will verbalize and demonstrate understanding of self and infant care  Outcome: Progressing     Problem: Psychosocial - Postpartum  Goal: Patient will verbalize and demonstrate effective bonding and parenting behavior  Outcome: Progressing     Problem: Altered Physiologic Condition  Goal: Patient physiologically stable as evidenced by normal lochia, palpable uterine involution and vitals within normal limits  Outcome: Progressing   The patient is Stable - Low risk of patient condition declining or worsening    Shift Goals  Clinical Goals: stable VS and lochia WNL  Patient Goals: healthy mom and baby  Family Goals: provide emotional support

## 2022-11-26 NOTE — PROGRESS NOTES
This RN reported to Dr Moeller cbc results. Per MD, continue to monitor pt and order CBC in 12 hours.

## 2022-11-26 NOTE — PROGRESS NOTES
"Katrina Trejo PP day 1    Subjective: Abdominal pain. no, ambulating .yes, tolerating liquids .yes, tolerating regular diet .yes, flatus.yes, BM .no, Bleeding .light, voiding .yes,dizziness .no, breast feeding.yes, breast tenderness .no    /73   Pulse 85   Temp 36.5 °C (97.7 °F)   Resp 17   Ht 1.626 m (5' 4\")   Wt 75.3 kg (166 lb)   SpO2 97%   Breast Exam: Tenderness .no, Engourgement .no, Mastitis .no  Abdomen soft, non-tender. BS normal. No masses,  No organomegaly  Incision: none  Fundus Tenderness:no, Below umbilicus:Yes, firm  Perineumperineum intact  Extremitiesno cyanosis, clubbing    Meds:   No current facility-administered medications on file prior to encounter.     No current outpatient medications on file prior to encounter.       Lab:   Recent Results (from the past 48 hour(s))   CBC WITH DIFFERENTIAL    Collection Time: 11/25/22 12:02 PM   Result Value Ref Range    WBC 24.7 (H) 4.8 - 10.8 K/uL    RBC 4.10 (L) 4.20 - 5.40 M/uL    Hemoglobin 11.4 (L) 12.0 - 16.0 g/dL    Hematocrit 34.9 (L) 37.0 - 47.0 %    MCV 85.1 81.4 - 97.8 fL    MCH 27.8 27.0 - 33.0 pg    MCHC 32.7 (L) 33.6 - 35.0 g/dL    RDW 42.9 35.9 - 50.0 fL    Platelet Count 171 164 - 446 K/uL    MPV 12.5 9.0 - 12.9 fL    Neutrophils-Polys 88.60 (H) 44.00 - 72.00 %    Lymphocytes 4.00 (L) 22.00 - 41.00 %    Monocytes 6.10 0.00 - 13.40 %    Eosinophils 0.00 0.00 - 6.90 %    Basophils 0.40 0.00 - 1.80 %    Immature Granulocytes 0.90 0.00 - 0.90 %    Nucleated RBC 0.00 /100 WBC    Neutrophils (Absolute) 21.90 (H) 2.00 - 7.15 K/uL    Lymphs (Absolute) 0.98 (L) 1.00 - 4.80 K/uL    Monos (Absolute) 1.51 (H) 0.00 - 0.85 K/uL    Eos (Absolute) 0.00 0.00 - 0.51 K/uL    Baso (Absolute) 0.09 0.00 - 0.12 K/uL    Immature Granulocytes (abs) 0.22 (H) 0.00 - 0.11 K/uL    NRBC (Absolute) 0.00 K/uL   CBC without differential- Once in 12 hours post delivery    Collection Time: 11/25/22 11:13 PM   Result Value Ref Range    WBC 18.1 (H) 4.8 - " 10.8 K/uL    RBC 2.83 (L) 4.20 - 5.40 M/uL    Hemoglobin 7.8 (L) 12.0 - 16.0 g/dL    Hematocrit 24.0 (L) 37.0 - 47.0 %    MCV 84.8 81.4 - 97.8 fL    MCH 27.6 27.0 - 33.0 pg    MCHC 32.5 (L) 33.6 - 35.0 g/dL    RDW 43.5 35.9 - 50.0 fL    Platelet Count 125 (L) 164 - 446 K/uL    MPV 12.7 9.0 - 12.9 fL   CBC WITHOUT DIFFERENTIAL    Collection Time: 11/26/22  8:52 AM   Result Value Ref Range    WBC 12.9 (H) 4.8 - 10.8 K/uL    RBC 2.71 (L) 4.20 - 5.40 M/uL    Hemoglobin 7.5 (L) 12.0 - 16.0 g/dL    Hematocrit 23.4 (L) 37.0 - 47.0 %    MCV 86.3 81.4 - 97.8 fL    MCH 27.7 27.0 - 33.0 pg    MCHC 32.1 (L) 33.6 - 35.0 g/dL    RDW 44.4 35.9 - 50.0 fL    Platelet Count 108 (L) 164 - 446 K/uL    MPV 12.6 9.0 - 12.9 fL       Assessment and Plan  Course complicated by anemia from blood loss c/w von Willebrand  No heavy bleeding or foul vaginal discharge     Continue Routine postpartum care  Ambulate   Continue TXA

## 2022-11-26 NOTE — CARE PLAN
The patient is Stable - Low risk of patient condition declining or worsening    Shift Goals  Clinical Goals: remain clinically stable  Patient Goals: healthy mom and baby  Family Goals: provide emotional support    Progress made toward(s) clinical / shift goals:  Patient will ask for pain medication when needed.  Patient is free from signs and symptoms of infection at this time.  Assessment will continue.     Patient is not progressing towards the following goals:

## 2022-11-27 VITALS
HEIGHT: 64 IN | HEART RATE: 95 BPM | OXYGEN SATURATION: 98 % | TEMPERATURE: 97.8 F | WEIGHT: 166 LBS | DIASTOLIC BLOOD PRESSURE: 85 MMHG | RESPIRATION RATE: 17 BRPM | SYSTOLIC BLOOD PRESSURE: 132 MMHG | BODY MASS INDEX: 28.34 KG/M2

## 2022-11-27 PROCEDURE — 700111 HCHG RX REV CODE 636 W/ 250 OVERRIDE (IP): Performed by: OBSTETRICS & GYNECOLOGY

## 2022-11-27 PROCEDURE — 700105 HCHG RX REV CODE 258: Performed by: OBSTETRICS & GYNECOLOGY

## 2022-11-27 PROCEDURE — 700102 HCHG RX REV CODE 250 W/ 637 OVERRIDE(OP): Performed by: OBSTETRICS & GYNECOLOGY

## 2022-11-27 PROCEDURE — A9270 NON-COVERED ITEM OR SERVICE: HCPCS | Performed by: OBSTETRICS & GYNECOLOGY

## 2022-11-27 PROCEDURE — 700101 HCHG RX REV CODE 250: Performed by: OBSTETRICS & GYNECOLOGY

## 2022-11-27 RX ADMIN — ONDANSETRON 4 MG: 4 TABLET, ORALLY DISINTEGRATING ORAL at 05:08

## 2022-11-27 RX ADMIN — TRANEXAMIC ACID 1000 MG: 100 INJECTION, SOLUTION INTRAVENOUS at 05:08

## 2022-11-27 RX ADMIN — TRANEXAMIC ACID 1000 MG: 100 INJECTION, SOLUTION INTRAVENOUS at 13:29

## 2022-11-27 RX ADMIN — PRENATAL WITH FERROUS FUM AND FOLIC ACID 1 TABLET: 3080; 920; 120; 400; 22; 1.84; 3; 20; 10; 1; 12; 200; 27; 25; 2 TABLET ORAL at 08:00

## 2022-11-27 RX ADMIN — ONDANSETRON 4 MG: 4 TABLET, ORALLY DISINTEGRATING ORAL at 12:47

## 2022-11-27 NOTE — PROGRESS NOTES
Patient discharged home with no needs. Discharge information and education provided by this RN, all questions answered. PIV removed. All belongings returned. Patient refused escorted and wheelchair, patient and family walked out with infant in the car seat.

## 2022-11-27 NOTE — PROGRESS NOTES
Report received from AM RN at 1900. Pt sitting up in bed, calm. Denies pain at this time. Fundus firm; lochia scant. Pt ambulates independently in room. POC discussed. Call light within reach.

## 2022-11-27 NOTE — DISCHARGE INSTRUCTIONS

## 2022-11-27 NOTE — DISCHARGE SUMMARY
Discharge Summary:      Katrina Trejo    Admit Date:   2022  Discharge Date:  2022     Admitting diagnosis:  Indication for care in labor or delivery [O75.9]  Discharge Diagnosis: Status post vaginal, spontaneous.  Pregnancy Complications: IUGR and von Willebrand  Tubal Ligation:  no     History:  Past Medical History:   Diagnosis Date    Abnormal uterine bleeding     Heart burn     History of panic attacks     Psychiatric problem     anxiety    Von Willebrand disease     Patient states diagnosed with type 1 in the past. [facility].     OB History    Para Term  AB Living   1 1 1     1   SAB IAB Ectopic Molar Multiple Live Births           0 1      # Outcome Date GA Lbr Joey/2nd Weight Sex Delivery Anes PTL Lv   1 Term 22 38w1d / 00:54 2.69 kg (5 lb 14.9 oz) M Vag-Spont EPI N RAIMUNDO        Patient has no known allergies.  Patient Active Problem List    Diagnosis Date Noted    Von Willebrand disease 2021        Hospital Course:   27 y.o. , now para 1, was admitted with the above mentioned diagnosis, underwent Induction of Labor, vaginal, spontaneous. Patient postpartum course was unremarkable, with progressive advancement in diet , ambulation and toleration of oral analgesia. Patient without complaints today and desires discharge.      Vitals:    22 1800 22 0640 22 1800 22 0600   BP: 127/89 117/73 117/73 132/85   Pulse: (!) 117 85 93 95   Resp: 19 17 18 17   Temp: 36.4 °C (97.5 °F) 36.5 °C (97.7 °F) 36.6 °C (97.8 °F) 36.6 °C (97.8 °F)   TempSrc: Temporal  Temporal Temporal   SpO2: 97%  100% 98%   Weight:       Height:           Exam:  Breast Exam: negative  Abdomen: Abdomen soft, non-tender. BS normal. No masses,  No organomegaly  Fundus Non Tender: no  Incision: none  Perineum: perineum intact  Extremity: extremities, peripheral pulses and reflexes normal     Labs:  Recent Labs      11/25/22  1202 11/25/22  2313 11/26/22  0852   WBC 24.7* 18.1* 12.9*   RBC 4.10* 2.83* 2.71*   HEMOGLOBIN 11.4* 7.8* 7.5*   HEMATOCRIT 34.9* 24.0* 23.4*   MCV 85.1 84.8 86.3   MCH 27.8 27.6 27.7   MCHC 32.7* 32.5* 32.1*   RDW 42.9 43.5 44.4   PLATELETCT 171 125* 108*   MPV 12.5 12.7 12.6        Activity:   Discharge to home  Pelvic Rest x 6 weeks    Assessment:  Course complicated by von Willebrand and heavy bleeding  Discharge Assessment: No heavy bleeding or foul vaginal discharge      Follow up: 6 weeks  Discharge Meds:   No current outpatient medications on file.   Take home TXA TID x 3 days    Carrie Moeller M.D.

## 2022-11-27 NOTE — CARE PLAN
The patient is Stable - Low risk of patient condition declining or worsening    Shift Goals  Clinical Goals: D/C safely home  Patient Goals: D/C home  Family Goals: provide emotional support    Progress made toward(s) clinical / shift goals:    Problem: Knowledge Deficit - Postpartum  Goal: Patient will verbalize and demonstrate understanding of self and infant care  Outcome: Progressing     Problem: Altered Physiologic Condition  Goal: Patient physiologically stable as evidenced by normal lochia, palpable uterine involution and vitals within normal limits  Outcome: Progressing     Problem: Early Mobilization - Post Surgery  Goal: Early mobilization post surgery  Outcome: Progressing       Patient is not progressing towards the following goals:

## (undated) DEVICE — SODIUM CHL IRRIGATION 0.9% 1000ML (12EA/CA)

## (undated) DEVICE — KIT ANESTHESIA W/CIRCUIT & 3/LT BAG W/FILTER (20EA/CA)

## (undated) DEVICE — LACTATED RINGERS INJ 1000 ML - (14EA/CA 60CA/PF)

## (undated) DEVICE — ELECTRODE 850 FOAM ADHESIVE - HYDROGEL RADIOTRNSPRNT (50/PK)

## (undated) DEVICE — SET EXTENSION WITH 2 PORTS (48EA/CA) ***PART #2C8610 IS A SUBSTITUTE*****

## (undated) DEVICE — DRESSING NON ADHERENT 3 X 4 - STERILE (100/BX 12BX/CA)

## (undated) DEVICE — Device

## (undated) DEVICE — TUBING CLEARLINK DUO-VENT - C-FLO (48EA/CA)

## (undated) DEVICE — NEPTUNE 4 PORT MANIFOLD - (20/PK)

## (undated) DEVICE — MASK ANESTHESIA ADULT  - (100/CA)

## (undated) DEVICE — GLOVE BIOGEL SZ 6 PF LATEX - (50EA/BX 4BX/CA)

## (undated) DEVICE — PROTECTOR ULNA NERVE - (36PR/CA)

## (undated) DEVICE — PAD SANITARY 11IN MAXI IND WRAPPED  (12EA/PK 24PK/CA)

## (undated) DEVICE — SET LEADWIRE 5 LEAD BEDSIDE DISPOSABLE ECG (1SET OF 5/EA)

## (undated) DEVICE — SLEEVE, VASO, THIGH, MED

## (undated) DEVICE — GOWN WARMING STANDARD FLEX - (30/CA)

## (undated) DEVICE — TRAY SRGPRP PVP IOD WT PRP - (20/CA)

## (undated) DEVICE — SUCTION INSTRUMENT YANKAUER BULBOUS TIP W/O VENT (50EA/CA)

## (undated) DEVICE — TOWEL STOP TIMEOUT SAFETY FLAG (40EA/CA)

## (undated) DEVICE — SENSOR SPO2 NEO LNCS ADHESIVE (20/BX) SEE USER NOTES

## (undated) DEVICE — GLOVE BIOGEL ECLIPSE PF LATEX SIZE 6.0 (50PR/BX)

## (undated) DEVICE — HEAD HOLDER JUNIOR/ADULT